# Patient Record
Sex: MALE | Race: WHITE | NOT HISPANIC OR LATINO | Employment: OTHER | URBAN - METROPOLITAN AREA
[De-identification: names, ages, dates, MRNs, and addresses within clinical notes are randomized per-mention and may not be internally consistent; named-entity substitution may affect disease eponyms.]

---

## 2017-06-09 ENCOUNTER — TRANSFERRED RECORDS (OUTPATIENT)
Dept: HEALTH INFORMATION MANAGEMENT | Facility: CLINIC | Age: 72
End: 2017-06-09

## 2017-06-09 ENCOUNTER — MEDICAL CORRESPONDENCE (OUTPATIENT)
Dept: HEALTH INFORMATION MANAGEMENT | Facility: CLINIC | Age: 72
End: 2017-06-09

## 2017-10-03 ENCOUNTER — PRE VISIT (OUTPATIENT)
Dept: ORTHOPEDICS | Facility: CLINIC | Age: 72
End: 2017-10-03

## 2017-10-03 NOTE — TELEPHONE ENCOUNTER
1.  Date/reason for appt: 10/19/17 - Right let tingling & numbness / increased falling    2.  Referring provider: [facility]    3.  Call to patient (Yes / No - short description): yes, pt called. Pt states all his records are with [facility].     4.  Previous care at:   [facility]

## 2017-10-03 NOTE — TELEPHONE ENCOUNTER
Records received from [facility].   Office notes:  DOS 6/9/17 with Dr. Luis Marshall (Ortho)  DOS 9/25/14 with Dr. Pavan Chu (unrelated to spine)  DOS 6/6/12 with Dr. Chepe Urbina    Radiology reports:  DOS 6/4/12 Lumbar XR (results within Dr Urbina's office note)   DOS 7/25/11 CT Myelogram (results within Dr Urbina's office note)     Missing/needed records:  S/P LLIS L2-[facility]  L1-2 fusion and L3-4 with dr vera at [facility] in 1999 or 2000 *Will need Paper Chart

## 2017-10-06 NOTE — TELEPHONE ENCOUNTER
Records Received From: [facility]     Date / Exam / Location   (*specify location only if different than the sending clinic) COMMENTS / MISSING  (be specific)   Office Notes: 2/25/13, 2/18/15 with Dr. Chepe Urbina    Radiology Reports:  (use exam date) - 4/6/17 XR Lumbar Spine  - 2/5/14, 2/5/15, 77/25/11 CT Lumbar Spine  - 1/31/05 Cervical Spine XR  - 1/2/12, 7/25/11 CT Cervical Spine  - 2/5/14, 9/12/12 XR Entire Spine  - 2/5/14 & 2/27/13, 10/2/13, 9/12/13 XR Spine/Lumbar Standing 10/6/17, Imaging CDs sent to Amy Ellis by [employer].    Injection Reports: 8/27/12, 12/18/12, and 7/6/12 Lumbosacral Injections    Op Notes/Procedure: 2/25/13 LLIF L2-3 with Dr. Urbina ([facility])    Implant Records: 2/25/13 LLIF L2-3

## 2017-10-19 ENCOUNTER — MEDICAL CORRESPONDENCE (OUTPATIENT)
Dept: HEALTH INFORMATION MANAGEMENT | Facility: CLINIC | Age: 72
End: 2017-10-19

## 2017-11-07 ASSESSMENT — ENCOUNTER SYMPTOMS
ARTHRALGIAS: 0
LOSS OF CONSCIOUSNESS: 0
TROUBLE SWALLOWING: 0
TREMORS: 0
MEMORY LOSS: 0
BACK PAIN: 1
NECK MASS: 0
SORE THROAT: 0
DIZZINESS: 1
MUSCLE CRAMPS: 1
MYALGIAS: 1
TINGLING: 0
DIFFICULTY URINATING: 0
HEMATURIA: 1
SINUS CONGESTION: 0
SMELL DISTURBANCE: 0
NECK PAIN: 0
STIFFNESS: 1
PARALYSIS: 0
NUMBNESS: 1
JOINT SWELLING: 0
DYSURIA: 1
WEAKNESS: 1
DISTURBANCES IN COORDINATION: 1
TASTE DISTURBANCE: 0
SINUS PAIN: 0
SPEECH CHANGE: 0
FLANK PAIN: 1
HEADACHES: 1
HOARSE VOICE: 0
SEIZURES: 0

## 2017-11-20 DIAGNOSIS — M54.9 BACK PAIN: Primary | ICD-10-CM

## 2017-11-21 ENCOUNTER — OFFICE VISIT (OUTPATIENT)
Dept: ORTHOPEDICS | Facility: CLINIC | Age: 72
End: 2017-11-21

## 2017-11-21 VITALS — HEIGHT: 68 IN | WEIGHT: 260 LBS | BODY MASS INDEX: 39.4 KG/M2

## 2017-11-21 DIAGNOSIS — R26.89 BALANCE PROBLEMS: ICD-10-CM

## 2017-11-21 DIAGNOSIS — Z98.1 ARTHRODESIS STATUS: Primary | ICD-10-CM

## 2017-11-21 DIAGNOSIS — M54.16 LUMBAR RADICULOPATHY: ICD-10-CM

## 2017-11-21 RX ORDER — PANTOPRAZOLE SODIUM 40 MG/1
TABLET, DELAYED RELEASE ORAL
COMMUNITY
Start: 2012-01-01

## 2017-11-21 RX ORDER — VENLAFAXINE HYDROCHLORIDE 150 MG/1
CAPSULE, EXTENDED RELEASE ORAL
COMMUNITY
Start: 2006-01-01

## 2017-11-21 RX ORDER — AMLODIPINE BESYLATE 5 MG/1
TABLET ORAL
COMMUNITY
Start: 2014-01-01

## 2017-11-21 RX ORDER — SIMVASTATIN 40 MG
TABLET ORAL
COMMUNITY
Start: 2006-01-01

## 2017-11-21 RX ORDER — TROSPIUM CHLORIDE 20 MG/1
TABLET, FILM COATED ORAL
COMMUNITY
Start: 2016-01-01

## 2017-11-21 NOTE — PROGRESS NOTES
Spine Surg Hx:    ~1999 - AP fusion L1-2 and L3-4, with unilateral translaminar facet screw fixation; implantation of bone stim (Dr. 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   - Solid AP fusion L1-2 and L3-4.   - Retained internal bone stim.   - L>R L3 radiculopathy.   - Chronic LBP   - Class 2 obesity (BMI 36.55).  Procedure:  Part 1 - lateral:  - LLIF (RIGHT approach) L2-3, using iliac crest autograft.  - Placement of interbody PEEK cage L2-3.  - R anterior iliac crest bone graft harvest via same skin incision  - EMG monitoring.  Part 2 - prone:   - Bilat perc pedicle screw fixation L2-3.  - Removal of internal bone stim.  EBL:  200cc  Vol disc removed L2-3 = 10.2 mL  IMPLANTS:    - NuVasive XLIF system, CoRoent XL PEEK Cage L2-3 = 12mm ht x 22mm width x 60mm length, 10 degrees  - NuVasive Precept perc system: L2 = 7.5 x 50 mm bilat; L3 = 7.5 x 50 mm bilat; Two 5.5 x 45 mm rods.      [facility] (Carlitos).  Imp:    - Adj segment DDD and stenosis L2-3.    - Solid AP fusion L1-2 and L3-4.    - Retained implantable bone stim and post instr L1-2,L3-4.    - Chronic LBP.    - L>R radicular leg pain with neurogenic claudication.  P> L L4-5 TFESI.  Cont exercises (previously given him by PT).  RTC 3 mos.    Telephone note 7/11/2012:  Signed up for surgery.    [facility] (Joanna/Carlitos):  2 yrs postop, extremely satisfied. (-) opioids.  Now with new onset weakness of L ankle dorsiflexion, R ankle plantarflexion, and bee sting sensation in bilat anterio shin along with hypersensitivity along medial L thigh.  Patient relates these sxs to his prostate surgery in 07/2013.      [facility] (Joanna/Anyi).  P> EMG R UE to  shanel for CTS; f/u with Dr. Urbina 1 month.  Answers for HPI/ROS submitted by the patient on 11/7/2017   General Symptoms: No  Skin Symptoms: No  HENT Symptoms: Yes  EYE SYMPTOMS: No  HEART SYMPTOMS: No  LUNG SYMPTOMS: No  INTESTINAL SYMPTOMS: No  URINARY SYMPTOMS: Yes  REPRODUCTIVE SYMPTOMS: No  SKELETAL SYMPTOMS: Yes  BLOOD SYMPTOMS: No  NERVOUS SYSTEM SYMPTOMS: Yes  MENTAL HEALTH SYMPTOMS: No  Ear pain: No  Ear discharge: No  Hearing loss: Yes  Tinnitus: Yes  Nosebleeds: No  Congestion: No  Sinus pain: No  Trouble swallowing: No   Voice hoarseness: No  Mouth sores: No  Sore throat: No  Tooth pain: No  Gum tenderness: No  Bleeding gums: No  Change in taste: No  Change in sense of smell: No  Dry mouth: No  Hearing aid used: Yes  Neck lump: No  Trouble holding urine or incontinence: Yes  Pain or burning: Yes  Trouble starting or stopping: No  Increased frequency of urination: Yes  Blood in urine: Yes  Decreased frequency of urination: No  Frequent nighttime urination: Yes  Flank pain: Yes  Difficulty emptying bladder: No  Back pain: Yes  Muscle aches: Yes  Neck pain: No  Swollen joints: No  Joint pain: No  Bone pain: Yes  Muscle cramps: Yes  Joint stiffness: Yes  Bone fracture: No  Trouble with coordination: Yes  Dizziness or trouble with balance: Yes  Fainting or black-out spells: No  Memory loss: No  Headache: Yes  Seizures: No  Speech problems: No  Tingling: No  Tremor: No  Weakness: Yes  Difficulty walking: Yes  Paralysis: No  Numbness: Yes

## 2017-11-21 NOTE — LETTER
11/21/2017       RE: Gabriel Hunter  [id number]  [place] [address]     Dear Colleague,    Thank you for referring your patient, Gabriel Hunter, to the [facility] at [facility]. Please see a copy of my visit note below.    Spine Surg Hx:     ~1999 - AP fusion L1-2 and L3-4, with unilateral translaminar facet screw fixation; implantation of bone stim (Dr. Dariusz Hill, [facility])     02/25/2013 - LLIF L2-3 (Carlitos, [facility])    REASON FOR CONSULTATION: Consult (Bilateral  leg numbness. States he has bad balance and has had increased fallling.  [facility])     REFERRING PHYSICIAN: Referred Self   PCP:Henrietta Avendano    History of Present Illness:    72/m, referred from [facility].  Previous surgeries as above.  [facility] (Joanna/Anyi).  Referred to me here at [facility].  Accompanied by wife.    Happy with 2013 spine surgery.  Helped with preop sxs.  Had prostate surgery later that year.  Since then, noted bilateral ankle weakness (weak R plantarflexion, weak L dorsiflexion).  Has not noted any improvement since.  Not getting worse either.  Main problem is balance.  Had been falling 2-3x/week before, although recently has not been falling.  Uses cane on R.  Some low back pain (left paraspinal/buttock).    Previous treatment:   PT after last surgery (2013).  Says PT did not help with his weakness.  Does not think PT at this point is going to help.      Oswestry (VASQUEZ) Questionnaire    OSWESTRY DISABILITY INDEX 11/7/2017   Section 1 - Pain intensity 1   Section 2 - Personal care (washing, dressing, etc.)  0   Section 3 - Lifting 4   Section 4 - Walking 3   Section 5 - Sitting 0   Section 6 - Standing 4   Section 7 - Sleeping 0   Section 8 - Sex life (if applicable) -1   Section 9 - Social life 3   Section 10 - Traveling 1   Count 9   Sum 16   Oswestry Score (%) 35.56   SECTION 1-PAIN INTENSITY The pain is very mild  at the moment.   SECTION 2-PERSONAL CARE (WASHING,DRESSING,ETC.) I can look after myself normally without causing additional pain.   SECTION 3-LIFTING I can only lift very light weights.   SECTION 4-WALKING Pain prevents me from walking more than 100 yards.   SECTION 5-SITTING I can sit in any chair as long as I like.   SECTION 6-STANDING Pain prevents me from standing for more than 10 minutes.   SECTION 7-SLEEPING My sleep is never interrupted by pain.   SECTION 8-SEX LIFE (IF APPLICABLE) Not answered/NA   SECTION 9-SOCIAL LIFE Pain has restricted my social life and I do not go out as often.   SECTION 10-TRAVELING I can travel anywhere but it gives me additional pain.   Oswestry Disability Index: Count 9   VASQUEZ: Total Score = SUM (total for answered questions) 16   Computed Oswestry Score 35.55 (%)   Some recent data might be hidden        Back pain 1/10; R leg 0.5, L leg 1.5.      PROMIS-10 Scores  Global Mental Health Score: (P) 9  Global Physical Health Score: (P) 10  PROMIS TOTAL - SUBSCORES: (P) 19    ROS:  A 12-point review of systems was completed and is negative except for otherwise noted above in the history of present illness.    Med Hx:  GERD, ED, sleep apnea, obesity, cataract, depressive disorder, knee osteoarthritis, postate CA, astigmatism, CTS.    Surg Hx:  As above spine surgeries  Also prostate surgery 2013  R TKA ([facility] Aug 2008).  L CTR (April 2012).    Allergies:  Allergies   Allergen Reactions     Penicillins Itching     Sildenafil Muscle Pain (Myalgia)     Tadalafil Muscle Pain (Myalgia)       Meds:  Current Outpatient Prescriptions   Medication     amLODIPine (NORVASC) 5 MG tablet     pantoprazole (PROTONIX) 40 MG EC tablet     simvastatin (ZOCOR) 40 MG tablet     trospium (SANCTURA) 20 MG tablet     venlafaxine (EFFEXOR-XR) 150 MG 24 hr capsule     No current facility-administered medications for this visit.        Fam Hx:  No family history on file.    P/S Hx:  Social History   Substance  "Use Topics     Smoking status: Not on file     Smokeless tobacco: Not on file     Alcohol use Not on file     Retired, nonsmoker, (-) EtOH.    Physical Exam:  Very pleasant, healthy appearing, alert, oriented x 3, cooperative.  Normal mood and affect.  Not in cardiorespiratory distress.  Ht 1.727 m (5' 8\")  Wt 117.9 kg (260 lb)  BMI 39.53 kg/m2   Slightly hunched forward posture, with some knee flexion.  Usually uses cane on R.  Can walk without cane but with some imbalance, mildly wide-based.  Cannot walk on toes/heels (cites weakness R plantarflexion, L dorsiflexion).  (+) well-healed surgical scars: left thoracoabdominal, right MIS LLIF, lumbar bilat paraspinal, lumbar midline, R PSIS.  Limited lumbar ROM.    Neuro Exam:  Motor: 1/5 left TA, EHL.  All others 5/5, including psoas, quads, R TA, R EHL, bilat TA.  However cannot stand on toes/heels.  Sensory:  Mild decreased sensation bilat L2.  Clearly decreased sensation Left L5 dorsum of left foot; however, normal sensation lateral calf.           Imaging: EOS full spine x-rays show fusion L1-L4, some flattening of lumbar lordosis, but relatively mild sagittal imbalance.  LL = 45  L4-S1 = 39  PI = 61  PI-LL mismatch = 16  PT = 24  SVA  = +4.5cm  TPA = 23  GT = 28    04/2017 x-rays compared to 2014 shows some progression of spondylosis/degeneration at levels above the fusion (T12-L1,T11-12,T10-11).    CT 2015 shows bone formation within L2-3 cage, but still with faint cleft (not yet fully bridged).    Impression:   - 4.5 yrs s/p LLIF L2-3 (02/2013 [facility]).  - Solid AP fusion L1-2,L3-4.  - Chronic lumbar radiculopathy bilateral.       Plan:   Had good discussion with patient.  Unhappy with his symptoms at this point.  Cannot guarantee him that we have a surgical solution to offer.  It is possible his foot/ankle weakness may represent some permanent nerve damage.  - Thoracic and lumbar MRI  - Thoracic and lumbar CT  - Bilat LE EMGs    RTC after above, to " review and discuss.  All questions and concerns were answered to the patient's apparent satisfaction before leaving the clinic.     Total visit time > 45 mins, > 50% counseling and coordination of care.    Respectfully,    Chepe Urbina MD    Orthopaedic Spine Surgery  Dept Orthopaedic Surgery, [facility]  [phone] office, [phone] pager  [email or web address]      [facility]:  Op Report 02/25/2013:  Surgeon:  Chepe Urbina MD  Asst:  Antonio Sampson MD PGY-3  Dx:   - Adj segment spondylosis/DDD L2-3   - Stenosis with claudication L2-3.   - Solid AP fusion L1-2 and L3-4.   - Retained internal bone stim.   - L>R L3 radiculopathy.   - Chronic LBP   - Class 2 obesity (BMI 36.55).  Procedure:  Part 1 - lateral:  - LLIF (RIGHT approach) L2-3, using iliac crest autograft.  - Placement of interbody PEEK cage L2-3.  - R anterior iliac crest bone graft harvest via same skin incision  - EMG monitoring.  Part 2 - prone:   - Bilat perc pedicle screw fixation L2-3.  - Removal of internal bone stim.  EBL:  200cc  Vol disc removed L2-3 = 10.2 mL  IMPLANTS:    - NuVasive XLIF system, CoRoent XL PEEK Cage L2-3 = 12mm ht x 22mm width x 60mm length, 10 degrees  - NuVasive Precept perc system: L2 = 7.5 x 50 mm bilat; L3 = 7.5 x 50 mm bilat; Two 5.5 x 45 mm rods.        [facility] (Carlitos).  Imp:    - Adj segment DDD and stenosis L2-3.    - Solid AP fusion L1-2 and L3-4.    - Retained implantable bone stim and post instr L1-2,L3-4.    - Chronic LBP.    - L>R radicular leg pain with neurogenic claudication.  P> L L4-5 TFESI.  Cont exercises (previously given him by PT).  RTC 3 mos.     Telephone note 7/11/2012:  Signed up for surgery.     [facility] (Joanna/Carlitos):  2 yrs postop, extremely satisfied. (-) opioids.  Now with new onset weakness of L ankle dorsiflexion, R ankle plantarflexion, and bee sting sensation in bilat anterio  shin along with hypersensitivity along medial L thigh.  Patient relates these sxs to his prostate surgery in 07/2013.       [facility] (Joanna/Anyi).  P> EMG R UE to eval for CTS; f/u with Dr. Urbina 1 month.      Addendum 11/25/17:  Reviewed thoracic and lumbar CT done 11/21/17: solid interbody fusion L1-2,L2-3,L3-4; (+) advanced facet arthrosis L4-5,L5-S1, with relatively preserved disc spaces; (+) advanced multilevel spondylosis with bridging osteophytes thoracic spine.  NO worrisome spine findings that warrant urgent treatment.  Radiologist (Dr. Guerra) also mentioned left lower lobe pulmonary nodule, that seems stable compared to 02/2014 x-ray.    Will discuss further with patient when he returns to clinic.  Will ask my nurses Shanae Carlin / Jasmin Toribio to forward radiologist report to PCP (Dr. Henrietta Avendano at [facility]); will defer to PCP re need for further workup of pulmonary nodule.      Again, thank you for allowing me to participate in the care of your patient.      Sincerely,    Chepe Urbina MD
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
## 2017-11-21 NOTE — PROGRESS NOTES
Spine Surg Hx:     ~1999 - AP fusion L1-2 and L3-4, with unilateral translaminar facet screw fixation; implantation of bone stim (Dr. Dariusz Hill, [facility])     02/25/2013 - LLIF L2-3 (Carlitos, [facility])    REASON FOR CONSULTATION: Consult (Bilateral  leg numbness. States he has bad balance and has had increased fallling.  [facility]     REFERRING PHYSICIAN: Referred Self   PCP:Henrietta Avendano A    History of Present Illness:    72/m, referred from [facility].  Previous surgeries as above.  [facility] (Joanna/Anyi).  Referred to me here at [facility].  Accompanied by wife.    Happy with 2013 spine surgery.  Helped with preop sxs.  Had prostate surgery later that year.  Since then, noted bilateral ankle weakness (weak R plantarflexion, weak L dorsiflexion).  Has not noted any improvement since.  Not getting worse either.  Main problem is balance.  Had been falling 2-3x/week before, although recently has not been falling.  Uses cane on R.  Some low back pain (left paraspinal/buttock).    Previous treatment:   PT after last surgery (2013).  Says PT did not help with his weakness.  Does not think PT at this point is going to help.      Oswestry (VASQUEZ) Questionnaire    OSWESTRY DISABILITY INDEX 11/7/2017   Section 1 - Pain intensity 1   Section 2 - Personal care (washing, dressing, etc.)  0   Section 3 - Lifting 4   Section 4 - Walking 3   Section 5 - Sitting 0   Section 6 - Standing 4   Section 7 - Sleeping 0   Section 8 - Sex life (if applicable) -1   Section 9 - Social life 3   Section 10 - Traveling 1   Count 9   Sum 16   Oswestry Score (%) 35.56   SECTION 1-PAIN INTENSITY The pain is very mild at the moment.   SECTION 2-PERSONAL CARE (WASHING,DRESSING,ETC.) I can look after myself normally without causing additional pain.   SECTION 3-LIFTING I can only lift very light weights.   SECTION 4-WALKING Pain prevents me from walking more than 100 yards.   SECTION 5-SITTING I can sit  in any chair as long as I like.   SECTION 6-STANDING Pain prevents me from standing for more than 10 minutes.   SECTION 7-SLEEPING My sleep is never interrupted by pain.   SECTION 8-SEX LIFE (IF APPLICABLE) Not answered/NA   SECTION 9-SOCIAL LIFE Pain has restricted my social life and I do not go out as often.   SECTION 10-TRAVELING I can travel anywhere but it gives me additional pain.   Oswestry Disability Index: Count 9   VASQUEZ: Total Score = SUM (total for answered questions) 16   Computed Oswestry Score 35.55 (%)   Some recent data might be hidden        Back pain 1/10; R leg 0.5, L leg 1.5.      PROMIS-10 Scores  Global Mental Health Score: (P) 9  Global Physical Health Score: (P) 10  PROMIS TOTAL - SUBSCORES: (P) 19    ROS:  A 12-point review of systems was completed and is negative except for otherwise noted above in the history of present illness.    Med Hx:  GERD, ED, sleep apnea, obesity, cataract, depressive disorder, knee osteoarthritis, postate CA, astigmatism, CTS.    Surg Hx:  As above spine surgeries  Also prostate surgery 2013  R TKA ([facility] Aug 2008).  L CTR (April 2012).    Allergies:  Allergies   Allergen Reactions     Penicillins Itching     Sildenafil Muscle Pain (Myalgia)     Tadalafil Muscle Pain (Myalgia)       Meds:  Current Outpatient Prescriptions   Medication     amLODIPine (NORVASC) 5 MG tablet     pantoprazole (PROTONIX) 40 MG EC tablet     simvastatin (ZOCOR) 40 MG tablet     trospium (SANCTURA) 20 MG tablet     venlafaxine (EFFEXOR-XR) 150 MG 24 hr capsule     No current facility-administered medications for this visit.        Fam Hx:  No family history on file.    P/S Hx:  Social History   Substance Use Topics     Smoking status: Not on file     Smokeless tobacco: Not on file     Alcohol use Not on file     Retired, nonsmoker, (-) EtOH.    Physical Exam:  Very pleasant, healthy appearing, alert, oriented x 3, cooperative.  Normal mood and affect.  Not in cardiorespiratory  "distress.  Ht 1.727 m (5' 8\")  Wt 117.9 kg (260 lb)  BMI 39.53 kg/m2   Slightly hunched forward posture, with some knee flexion.  Usually uses cane on R.  Can walk without cane but with some imbalance, mildly wide-based.  Cannot walk on toes/heels (cites weakness R plantarflexion, L dorsiflexion).  (+) well-healed surgical scars: left thoracoabdominal, right MIS LLIF, lumbar bilat paraspinal, lumbar midline, R PSIS.  Limited lumbar ROM.    Neuro Exam:  Motor: 1/5 left TA, EHL.  All others 5/5, including psoas, quads, R TA, R EHL, bilat TA.  However cannot stand on toes/heels.  Sensory:  Mild decreased sensation bilat L2.  Clearly decreased sensation Left L5 dorsum of left foot; however, normal sensation lateral calf.           Imaging: EOS full spine x-rays show fusion L1-L4, some flattening of lumbar lordosis, but relatively mild sagittal imbalance.  LL = 45  L4-S1 = 39  PI = 61  PI-LL mismatch = 16  PT = 24  SVA  = +4.5cm  TPA = 23  GT = 28    04/2017 x-rays compared to 2014 shows some progression of spondylosis/degeneration at levels above the fusion (T12-L1,T11-12,T10-11).    CT 2015 shows bone formation within L2-3 cage, but still with faint cleft (not yet fully bridged).    Impression:   - 4.5 yrs s/p LLIF L2-3 (02/2013 Carlitos [facility]).  - Solid AP fusion L1-2,L3-4.  - Chronic lumbar radiculopathy bilateral.       Plan:   Had good discussion with patient.  Unhappy with his symptoms at this point.  Cannot guarantee him that we have a surgical solution to offer.  It is possible his foot/ankle weakness may represent some permanent nerve damage.  - Thoracic and lumbar MRI  - Thoracic and lumbar CT  - Bilat LE EMGs    RTC after above, to review and discuss.  All questions and concerns were answered to the patient's apparent satisfaction before leaving the clinic.     Total visit time > 45 mins, > 50% counseling and coordination of care.    Respectfully,    Chepe Urbina MD  Associate " Professor  Orthopaedic Spine Surgery  Dept Orthopaedic Surgery, [facility]  [phone] office, [phone] pager  [email or web address]      [facility]:  Op Report 02/25/2013:  Surgeon:  Chepe Urbina MD  Asst:  Antonio Sampson MD PGY-3  Dx:   - Adj segment spondylosis/DDD L2-3   - Stenosis with claudication L2-3.   - Solid AP fusion L1-2 and L3-4.   - Retained internal bone stim.   - L>R L3 radiculopathy.   - Chronic LBP   - Class 2 obesity (BMI 36.55).  Procedure:  Part 1 - lateral:  - LLIF (RIGHT approach) L2-3, using iliac crest autograft.  - Placement of interbody PEEK cage L2-3.  - R anterior iliac crest bone graft harvest via same skin incision  - EMG monitoring.  Part 2 - prone:   - Bilat perc pedicle screw fixation L2-3.  - Removal of internal bone stim.  EBL:  200cc  Vol disc removed L2-3 = 10.2 mL  IMPLANTS:    - NuVasive XLIF system, CoRoent XL PEEK Cage L2-3 = 12mm ht x 22mm width x 60mm length, 10 degrees  - NuVasive Precept perc system: L2 = 7.5 x 50 mm bilat; L3 = 7.5 x 50 mm bilat; Two 5.5 x 45 mm rods.        [facility] (Carlitos).  Imp:    - Adj segment DDD and stenosis L2-3.    - Solid AP fusion L1-2 and L3-4.    - Retained implantable bone stim and post instr L1-2,L3-4.    - Chronic LBP.    - L>R radicular leg pain with neurogenic claudication.  P> L L4-5 TFESI.  Cont exercises (previously given him by PT).  RTC 3 mos.     Telephone note 7/11/2012:  Signed up for surgery.     [facility] (Joanna/Carlitos):  2 yrs postop, extremely satisfied. (-) opioids.  Now with new onset weakness of L ankle dorsiflexion, R ankle plantarflexion, and bee sting sensation in bilat anterio shin along with hypersensitivity along medial L thigh.  Patient relates these sxs to his prostate surgery in 07/2013.       [facility] (Joanna/Anyi).  P> EMG R UE to eval for CTS; f/u with Dr. Urbina 1 month.  Answers for  HPI/ROS submitted by the patient on 11/7/2017   General Symptoms: No  Skin Symptoms: No  HENT Symptoms: Yes  EYE SYMPTOMS: No  HEART SYMPTOMS: No  LUNG SYMPTOMS: No  INTESTINAL SYMPTOMS: No  URINARY SYMPTOMS: Yes  REPRODUCTIVE SYMPTOMS: No  SKELETAL SYMPTOMS: Yes  BLOOD SYMPTOMS: No  NERVOUS SYSTEM SYMPTOMS: Yes  MENTAL HEALTH SYMPTOMS: No  Ear pain: No  Ear discharge: No  Hearing loss: Yes  Tinnitus: Yes  Nosebleeds: No  Congestion: No  Sinus pain: No  Trouble swallowing: No   Voice hoarseness: No  Mouth sores: No  Sore throat: No  Tooth pain: No  Gum tenderness: No  Bleeding gums: No  Change in taste: No  Change in sense of smell: No  Dry mouth: No  Hearing aid used: Yes  Neck lump: No  Trouble holding urine or incontinence: Yes  Pain or burning: Yes  Trouble starting or stopping: No  Increased frequency of urination: Yes  Blood in urine: Yes  Decreased frequency of urination: No  Frequent nighttime urination: Yes  Flank pain: Yes  Difficulty emptying bladder: No  Back pain: Yes  Muscle aches: Yes  Neck pain: No  Swollen joints: No  Joint pain: No  Bone pain: Yes  Muscle cramps: Yes  Joint stiffness: Yes  Bone fracture: No  Trouble with coordination: Yes  Dizziness or trouble with balance: Yes  Fainting or black-out spells: No  Memory loss: No  Headache: Yes  Seizures: No  Speech problems: No  Tingling: No  Tremor: No  Weakness: Yes  Difficulty walking: Yes  Paralysis: No  Numbness: Yes    Addendum 11/25/17:  Reviewed thoracic and lumbar CT done 11/21/17: solid interbody fusion L1-2,L2-3,L3-4; (+) advanced facet arthrosis L4-5,L5-S1, with relatively preserved disc spaces; (+) advanced multilevel spondylosis with bridging osteophytes thoracic spine.  NO worrisome spine findings that warrant urgent treatment.  Radiologist (Dr. Guerra) also mentioned left lower lobe pulmonary nodule, that seems stable compared to 02/2014 x-ray.    Will discuss further with patient when he returns to clinic.  Will ask my nurses Shanae  Raegan / Jasmin Toribio to forward radiologist report to PCP (Dr. Henrietta Avendano at [facility]); will defer to PCP re need for further workup of pulmonary nodule.

## 2017-11-21 NOTE — MR AVS SNAPSHOT
After Visit Summary   11/21/2017    Gabriel Hunter    MRN: [id number]           Patient Information     Date Of Birth          1945        Visit Information        Provider Department      11/21/2017 7:00 AM Chepe Urbina MD [facility]        Today's Diagnoses     s/p fusion L1-L4    -  1    Balance problems        Lumbar radiculopathy           Follow-ups after your visit        Your next 10 appointments already scheduled     Nov 21, 2017  8:00 PM CST   (Arrive by 7:45 PM)   CT LUMBAR SPINE W/O CONTRAST with [address] ([facility])    [address] [id number] [phone]           Please bring any scans or X-rays taken at other hospitals, if similar tests were done. Also bring a list of your medicines, including vitamins, minerals and over-the-counter drugs. It is safest to leave personal items at home.  Be sure to tell your doctor:   If you have any allergies.   If there s any chance you are pregnant.   If you are breastfeeding.   If you have any special needs.  You do not need to do anything special to prepare.  Please wear loose clothing, such as a sweat suit or jogging clothes. Avoid snaps, zippers and other metal. We may ask you to undress and put on a hospital gown.            Nov 21, 2017  8:20 PM CST   (Arrive by 8:05 PM)   CT THORACIC SPINE W/O CONTRAST with [address] ([facility])    [address] [id number] [phone]           Please bring any scans or X-rays taken at other hospitals, if similar tests were done. Also bring a list of your medicines, including vitamins, minerals and over-the-counter drugs. It is safest to leave personal items at home.  Be sure to tell your doctor:   If you have any allergies.   If there s any chance you are pregnant.   If you are breastfeeding.   If you  have any special needs.  You do not need to do anything special to prepare.  Please wear loose clothing, such as a sweat suit or jogging clothes. Avoid snaps, zippers and other metal. We may ask you to undress and put on a hospital gown.            Nov 30, 2017  7:00 PM CST   (Arrive by 6:45 PM)   MR LUMBAR SPINE W/O CONTRAST with [id number]   [facility] MRI ([facility])    [address] [address] [phone]           Take your medicines as usual, unless your doctor tells you not to. Bring a list of your current medicines to your exam (including vitamins, minerals and over-the-counter drugs). Also bring the results of similar scans you may have had.  Please remove any body piercings and hair extensions before you arrive.  Follow your doctor s orders. If you do not, we may have to postpone your exam.  You will not have contrast for this exam. You do not need to do anything special to prepare.  The MRI machine uses a strong magnet. Please wear clothes without metal (snaps, zippers). A sweatsuit works well, or we may give you a hospital gown.   **IMPORTANT** THE INSTRUCTIONS BELOW ARE ONLY FOR THOSE PATIENTS WHO HAVE BEEN TOLD THEY WILL RECEIVE SEDATION OR GENERAL ANESTHESIA DURING THEIR MRI PROCEDURE:  IF YOU WILL RECEIVE SEDATION (take medicine to help you relax during your exam):   You must get the medicine from your doctor before you arrive. Bring the medicine to the exam. Do not take it at home.   Arrive one hour early. Bring someone who can take you home after the test. Your medicine will make you sleepy. After the exam, you may not drive, take a bus or take a taxi by yourself.   No eating 8 hours before your exam. You may have clear liquids up until 4 hours before your exam. (Clear liquids include water, clear tea, black coffee and fruit juice without pulp.)  IF YOU WILL RECEIVE ANESTHESIA (be asleep for your exam):   Arrive 1 1/2 hours  early. Bring someone who can take you home after the test. You may not drive, take a bus or take a taxi by yourself.   No eating 8 hours before your exam. You may have clear liquids up until 4 hours before your exam. (Clear liquids include water, clear tea, black coffee and fruit juice without pulp.)   You will spend four to five hours in the recovery room.  Please call the Imaging Department at your exam site with any questions.            Nov 30, 2017  7:45 PM CST   (Arrive by 7:30 PM)   MR THORACIC SPINE W/O CONTRAST with [id number]   [facility] MRI ([facility])    [address] [facility]  1st Floor  [facility] [address] [phone]           Take your medicines as usual, unless your doctor tells you not to. Bring a list of your current medicines to your exam (including vitamins, minerals and over-the-counter drugs). Also bring the results of similar scans you may have had.  Please remove any body piercings and hair extensions before you arrive.  Follow your doctor s orders. If you do not, we may have to postpone your exam.  You will not have contrast for this exam. You do not need to do anything special to prepare.  The MRI machine uses a strong magnet. Please wear clothes without metal (snaps, zippers). A sweatsuit works well, or we may give you a hospital gown.   **IMPORTANT** THE INSTRUCTIONS BELOW ARE ONLY FOR THOSE PATIENTS WHO HAVE BEEN TOLD THEY WILL RECEIVE SEDATION OR GENERAL ANESTHESIA DURING THEIR MRI PROCEDURE:  IF YOU WILL RECEIVE SEDATION (take medicine to help you relax during your exam):   You must get the medicine from your doctor before you arrive. Bring the medicine to the exam. Do not take it at home.   Arrive one hour early. Bring someone who can take you home after the test. Your medicine will make you sleepy. After the exam, you may not drive, take a bus or take a taxi by yourself.   No eating 8 hours before your exam. You may have clear liquids up  until 4 hours before your exam. (Clear liquids include water, clear tea, black coffee and fruit juice without pulp.)  IF YOU WILL RECEIVE ANESTHESIA (be asleep for your exam):   Arrive 1 1/2 hours early. Bring someone who can take you home after the test. You may not drive, take a bus or take a taxi by yourself.   No eating 8 hours before your exam. You may have clear liquids up until 4 hours before your exam. (Clear liquids include water, clear tea, black coffee and fruit juice without pulp.)   You will spend four to five hours in the recovery room.  Please call the Imaging Department at your exam site with any questions.            Dec 12, 2017  8:45 AM CST   (Arrive by 8:30 AM)   EMG with Jose A Clarke MD   [facility] ([facility])    [address] [place] [address] [phone]           Do not use lotions or creams on the area to be tested. If you are on blood thinners (Warfarin, Coumadin, Lovenox, etc), please contact your primary care physician to check if it is safe to stop them 3 days prior to testing. If you have anxiety, please check with your referring physician to obtain anti-anxiety medication for the procedure.            Jan 04, 2018 10:20 AM CST   (Arrive by 9:50 AM)   RETURN SPINE with Chepe Urbina MD   [facility] ([facility])    [address] [place] [address] [phone]              Future tests that were ordered for you today     Open Future Orders        Priority Expected Expires Ordered    MRI Lumbar spine w/o contrast Routine  11/21/2018 11/21/2017    MRI Thoracic spine w/o contrast Routine  11/21/2018 11/21/2017    CT Thoracic spine w/o contrast Routine  11/21/2018 11/21/2017    CT lumbar spine without contrast Routine  11/21/2018 11/21/2017    EMG ([facility]) Routine  12/21/2017 11/21/2017            Who to contact     Please call  "your clinic at [phone] to:    Ask questions about your health    Make or cancel appointments    Discuss your medicines    Learn about your test results    Speak to your doctor   If you have compliments or concerns about an experience at your clinic, or if you wish to file a complaint, please contact [facility] Patient Relations at [phone] or email us at [email or web address]         Additional Information About Your Visit        [facility] Information     [employer] gives you secure access to your electronic health record. If you see a primary care provider, you can also send messages to your care team and make appointments. If you have questions, please call your primary care clinic.  If you do not have a primary care provider, please call [phone] and they will assist you.      [employer] is an electronic gateway that provides easy, online access to your medical records. With [employer], you can request a clinic appointment, read your test results, renew a prescription or communicate with your care team.     To access your existing account, please contact your [facility] or call [phone] for assistance.        Care EveryWhere ID     This is your Care EveryWhere ID. This could be used by other organizations to access your [place] medical records  [id number]        Your Vitals Were     Height BMI (Body Mass Index)                1.727 m (5' 8\") 39.53 kg/m2           Blood Pressure from Last 3 Encounters:   No data found for BP    Weight from Last 3 Encounters:   11/21/17 117.9 kg (260 lb)               Primary Care Provider Office Phone # Fax #    Henrietta LIU Romana [phone]       [facility]   [facility] [address]        Equal Access to Services     BITA FUENTES : Abimbola Torres, giovanny nixon, magdy aguilera. [employer] " [phone].    ATENCIÓN: Si kym pena, tiene a francisco disposición servicios gratuitos de asistencia lingüística. [place] denney [phone].    We comply with applicable federal civil rights laws and [place] laws. We do not discriminate on the basis of race, color, national origin, age, disability, sex, sexual orientation, or gender identity.            Thank you!     Thank you for choosing [facility]  for your care. Our goal is always to provide you with excellent care. Hearing back from our patients is one way we can continue to improve our services. Please take a few minutes to complete the written survey that you may receive in the mail after your visit with us. Thank you!             Your Updated Medication List - Protect others around you: Learn how to safely use, store and throw away your medicines at www.disposemymeds.org.          This list is accurate as of: 11/21/17  8:09 AM.  Always use your most recent med list.                   Brand Name Dispense Instructions for use Diagnosis    amLODIPine 5 MG tablet    NORVASC      Arthrodesis status, Balance problems, Lumbar radiculopathy       pantoprazole 40 MG EC tablet    PROTONIX      Arthrodesis status, Balance problems, Lumbar radiculopathy       simvastatin 40 MG tablet    ZOCOR      Arthrodesis status, Balance problems, Lumbar radiculopathy       trospium 20 MG tablet    SANCTURA      Arthrodesis status, Balance problems, Lumbar radiculopathy       venlafaxine 150 MG 24 hr capsule    EFFEXOR-XR      Arthrodesis status, Balance problems, Lumbar radiculopathy

## 2017-12-12 ENCOUNTER — OFFICE VISIT (OUTPATIENT)
Dept: NEUROLOGY | Facility: CLINIC | Age: 72
End: 2017-12-12

## 2017-12-12 DIAGNOSIS — G60.8 SENSORY POLYNEUROPATHY: ICD-10-CM

## 2017-12-12 DIAGNOSIS — M54.10 POLYRADICULOPATHY: ICD-10-CM

## 2017-12-12 NOTE — MR AVS SNAPSHOT
After Visit Summary   12/12/2017    Gabriel Hunter    MRN: [id number]           Patient Information     Date Of Birth          1945        Visit Information        Provider Department      12/12/2017 8:45 AM Jose A Clarke MD [facility] EMG        Today's Diagnoses     Polyradiculopathy        Sensory polyneuropathy           Follow-ups after your visit        Your next 10 appointments already scheduled     Jan 04, 2018 10:20 AM CST   (Arrive by 9:50 AM)   RETURN SPINE with Chepe Urbina MD   [facility] ([facility])    [address] [place] [id number] [phone]              Future tests that were ordered for you today     Open Future Orders        Priority Expected Expires Ordered    Needle EMG Each Extremity w/Paraspinal Area Complete (21455) Routine  12/12/2018 12/12/2017            Who to contact     Please call your clinic at [phone] to:    Ask questions about your health    Make or cancel appointments    Discuss your medicines    Learn about your test results    Speak to your doctor   If you have compliments or concerns about an experience at your clinic, or if you wish to file a complaint, please contact [facility] Patient Relations at [phone] or email us at [email or web address]         Additional Information About Your Visit        MyChart Information     [employer] gives you secure access to your electronic health record. If you see a primary care provider, you can also send messages to your care team and make appointments. If you have questions, please call your primary care clinic.  If you do not have a primary care provider, please call [phone] and they will assist you.      [employer] is an electronic gateway that provides easy, online access to your medical records. With [employer], you can request a clinic appointment, read your test results,  renew a prescription or communicate with your care team.     To access your existing account, please contact your [facility] or call [phone] for assistance.        Care EveryWhere ID     This is your Care EveryWhere ID. This could be used by other organizations to access your [place] medical records  [id number]         Blood Pressure from Last 3 Encounters:   No data found for BP    Weight from Last 3 Encounters:   11/21/17 117.9 kg (260 lb)              We Performed the Following     EMG ([facility])     HC NCS MOTOR W OR W/O F-WAVE, 7 OR 8        Primary Care Provider Office Phone # Fax #    Henrietta Avendano [phone]       [facility] [address] [facility] [address]        Equal Access to Services     BITA FUENTES : Hadii aad ku hadasho Soomaali, waaxda luqadaha, qaybta kaalmada adeegyada, waxay idiin hayaan anastacio real . [facility] [phone].    ATENCIÓN: Si habla español, tiene a francisco disposición servicios gratuitos de asistencia lingüística. [facility] [phone].    We comply with applicable federal civil rights laws and [place] laws. We do not discriminate on the basis of race, color, national origin, age, disability, sex, sexual orientation, or gender identity.            Thank you!     Thank you for choosing [facility]  for your care. Our goal is always to provide you with excellent care. Hearing back from our patients is one way we can continue to improve our services. Please take a few minutes to complete the written survey that you may receive in the mail after your visit with us. Thank you!             Your Updated Medication List - Protect others around you: Learn how to safely use, store and throw away your medicines at www.disposemymeds.org.          This list is accurate as of: 12/12/17  1:05 PM.  Always use your most recent med list.                   Brand Name Dispense Instructions for use Diagnosis    amLODIPine 5  MG tablet    NORVASC      Arthrodesis status, Balance problems, Lumbar radiculopathy       pantoprazole 40 MG EC tablet    PROTONIX      Arthrodesis status, Balance problems, Lumbar radiculopathy       simvastatin 40 MG tablet    ZOCOR      Arthrodesis status, Balance problems, Lumbar radiculopathy       trospium 20 MG tablet    SANCTURA      Arthrodesis status, Balance problems, Lumbar radiculopathy       venlafaxine 150 MG 24 hr capsule    EFFEXOR-XR      Arthrodesis status, Balance problems, Lumbar radiculopathy

## 2017-12-12 NOTE — LETTER
12/12/2017       RE: Gabriel Hunter  [id number]  [place] [address]     Dear Colleague,    Thank you for referring your patient, Gabriel Hunter, to the [facility] EMG at [facility]. Please see a copy of my visit note below.        [facility]  Electrodiagnostic Laboratory    Nerve Conduction & EMG Report          Patient:       Gabriel Hunter  Patient ID:    [id number]  Gender:        Male  YOB: 1945  Age:           72 Years 5 Months      Referring Physician: Chepe Urbina MD    History & Examination:    72 year old man with left foot drop and weakness of his right foot's plantarflexion. He is not absolutely sure when exactly those symptoms began, but he relates those to a prostate surgery he had in 7/2013. Prior to this he [facility] He also reports constant bilateral feet numbness. Query lumbosacral radiculopathies vs plexopathy vs other etiology for those symptoms.    Techniques: Motor and sensory conduction studies were done with surface recording electrodes. Temperature was monitored and recorded throughout the study. Upper extremities were maintained at a temperature of 32 degrees Centigrade or higher.  Lower extremities were maintained at a temperature of 31degrees Centigrade or higher. EMG was done with a concentric needle electrode.     Results:    Left radial antidromic sensory NCS showed mildly attenuated SNAP amplitude and normal conduction velocity. Bilateral sural SNAPs were absent. Left median motor NCS showed minimally prolonged distal latency, normal CMAP amplitudes, and mildly attenuated forearm conduction velocity. Bilateral deep peroneal motor NCSs recorded from EDB showed no response. Bilateral peroneal motor NCSs recorded from the tibialis anterior (TA) showed markedly (left) or mildly (right) attenuated CMAP amplitudes without segmental changes, and normal  conduction velocities. Bilateral tibial motor NCSs showed normal distal latencies, attenuated CMAP amplitudes, right worse than left, and moderately attenuated conduction velocities on both sides. Right tibial F-wave latencies were prolonged.  Needle EMG study was very abnormal. There were changes of active denervation (spontaneous activity) at muscles innervated by bilateral L5 and S1, and right L3 segments/myotomes. There were changes of chronic denervation/reinnervation (large, long duration MUPs with reduced recruitment patterns) among multiple segments/myotomes, including bilateral L4, L5, S1, and the right L3. The most severely affected segments were the left L5, and the right S1, as clinically expected. Paraspinal muscles were not sampled, because interpretation of changes appreciated at those muscles after lumbar fusion surgery is uncertain/controversial.    Interpretation:    Abnormal study. There is electrodiagnostic evidence of:    1. Bilateral S1, L5, L4, and to a lesser extent right L3, polyradiculopathies, vs anterior horn disease at the same levels, vs lumbosacral plexopathy. In this case, distinction between the above diagnoses cannot be accomplished by further EMG study, and requires clinical correlation, largely because paraspinal muscles are not useful to make a differential diagnosis, due to recent fusion.   2. Axonal, sensory predominant, length dependent polyneuropathy.    EMG Physician:    Jose A Clarke MD      Sensory NCS      Nerve / Sites Rec. Site Onset Peak NP Amp Ref. PP Amp Dist Jono Ref. Temp     ms ms  V  V  V cm m/s m/s  C   L RADIAL - Snuff      Forearm Snuff 2.40 3.13 13.1 15.0 10.4 12.5 52.2 48.0 32   R SURAL - Lat Mall 60      Calf Ankle 4.22 5.36 2.8 5.0 4.5 14 33.2 38.0 34      Calf Ankle NR NR NR 5.0 NR 14 NR  29      Calf Ankle    5.0  14   28.9   L SURAL - Lat Mall 60      Calf Ankle NR NR NR 5.0 NR 14 NR 38.0 31      Calf Ankle NR NR NR 5.0 NR 14 NR  31      Calf  Ankle NR NR NR 5.0 NR 14 NR  29.4       Motor NCS      Nerve / Sites Rec. Site Lat Ref. Amp Ref. Rel Amp Dist Jono Ref. Dur. Area Temp.     ms ms mV mV % cm m/s m/s ms %  C   L MEDIAN - APB      Wrist APB 4.43 4.40 9.7 5.0 100 8   5.78 100 32.4      Elbow APB 8.80  9.8  101 20.3 46.4 48.0 5.89 103 32.4   R DEEP PERONEAL - EDB 60      Ankle EDB NR 6.00 NR 2.0 NR 8   NR NR 28.7   L DEEP PERONEAL - EDB 60      Ankle EDB NR 6.00 NR 2.0 NR 8   NR NR 29.6   R TIBIAL - AH      Ankle AH 4.64 6.00 0.5 4.0 100 8   7.24 100 31      Pop Fos AH 18.13  0.3  61.5 43 31.9 38.0 9.32 114 31   L TIBIAL - AH      Ankle AH 5.16 6.00 1.3 4.0 100 8   6.20 100 31      Pop Fos AH 18.85  0.3  24.1 42 30.7 38.0 5.05 47.7 29   R PERONEAL - Tib Ant      Fib Head Tib Ant 5.26  2.8  100    8.65 100 31      Knee Tib Ant 7.40  2.9  103 10 46.8  8.75 102 31   L PERONEAL - Tib Ant      Fib Head Tib Ant 4.48  0.3  100    5.42 100 31      Knee Tib Ant 6.56  0.5  175 10 48.0  6.20 204 31       F  Wave      Nerve Min F Lat Max F Lat Mean FLat Temp.    ms ms ms  C   R TIBIAL 66.41 74.84 71.75 31       EMG Summary Table     Spontaneous MUAP Recruitment    IA Fib PSW Fasc H.F. Amp Dur. PPP Pattern   L. TIB ANTERIOR N 3+ 3+ None CRD 1+ 2+ 2+ Sev Reduced to Discrete   L. GASTROCN (MED) N 2+ 2+ 2+ (slow) None N N N N   L. VAST LATERALIS N None None None None 2+ 1+ N N   L. BIC FEM (S HEAD) N None None None None 2+ 2+ N Mod Reduced   L. GLUTEUS MED N 2+ 2+ None CRD 2+ 2+ N Mild-Mod Reduced   R. GASTROCN (MED) N 3+ None None CRD N 2+ 1+ Sev Reduced   R. TIB ANTERIOR N 3+ 3+ None None 2+ 3+ N Mod-SevReduced   R. VAST MEDIALIS N None None None None 2+ 1+ N Mild Reduced   R. ADD LONGUS N None None 2+ (slow) CRD 2+ 1+ N Mild-ModReduced   R. BIC FEM (S HEAD) N 3+ 3+ None CRD 2+ 2+ N Sev Reduced   R. GLUTEUS MED N None None None None 2+ 2+ N Sev Reduced                                  Again, thank you for allowing me to participate in the care of your patient.       Sincerely,    Jose A Clarke MD

## 2017-12-12 NOTE — PROGRESS NOTES
[facility]  Electrodiagnostic Laboratory    Nerve Conduction & EMG Report          Patient:       Gabriel Hunter  Patient ID:    [id number]  Gender:        Male  YOB: 1945  Age:           72 Years 5 Months      Referring Physician: Chepe Urbina MD    History & Examination:    72 year old man with left foot drop and weakness of his right foot's plantarflexion. He is not absolutely sure when exactly those symptoms began, but he relates those to a prostate surgery he had in 7/2013. Prior to this he [facility] He also reports constant bilateral feet numbness. Query lumbosacral radiculopathies vs plexopathy vs other etiology for those symptoms.    Techniques: Motor and sensory conduction studies were done with surface recording electrodes. Temperature was monitored and recorded throughout the study. Upper extremities were maintained at a temperature of 32 degrees Centigrade or higher.  Lower extremities were maintained at a temperature of 31degrees Centigrade or higher. EMG was done with a concentric needle electrode.     Results:    Left radial antidromic sensory NCS showed mildly attenuated SNAP amplitude and normal conduction velocity. Bilateral sural SNAPs were absent. Left median motor NCS showed minimally prolonged distal latency, normal CMAP amplitudes, and mildly attenuated forearm conduction velocity. Bilateral deep peroneal motor NCSs recorded from EDB showed no response. Bilateral peroneal motor NCSs recorded from the tibialis anterior (TA) showed markedly (left) or mildly (right) attenuated CMAP amplitudes without segmental changes, and normal conduction velocities. Bilateral tibial motor NCSs showed normal distal latencies, attenuated CMAP amplitudes, right worse than left, and moderately attenuated conduction velocities on both sides. Right tibial F-wave latencies were prolonged.  Needle EMG study was very abnormal.  There were changes of active denervation (spontaneous activity) at muscles innervated by bilateral L5 and S1, and right L3 segments/myotomes. There were changes of chronic denervation/reinnervation (large, long duration MUPs with reduced recruitment patterns) among multiple segments/myotomes, including bilateral L4, L5, S1, and the right L3. The most severely affected segments were the left L5, and the right S1, as clinically expected. Paraspinal muscles were not sampled, because interpretation of changes appreciated at those muscles after lumbar fusion surgery is uncertain/controversial.    Interpretation:    Abnormal study. There is electrodiagnostic evidence of:    1. Bilateral S1, L5, L4, and to a lesser extent right L3, polyradiculopathies, vs anterior horn disease at the same levels, vs lumbosacral plexopathy. In this case, distinction between the above diagnoses cannot be accomplished by further EMG study, and requires clinical correlation, largely because paraspinal muscles are not useful to make a differential diagnosis, due to recent fusion.   2. Axonal, sensory predominant, length dependent polyneuropathy.    EMG Physician:    Jose A Clarke MD      Sensory NCS      Nerve / Sites Rec. Site Onset Peak NP Amp Ref. PP Amp Dist Jono Ref. Temp     ms ms  V  V  V cm m/s m/s  C   L RADIAL - Snuff      Forearm Snuff 2.40 3.13 13.1 15.0 10.4 12.5 52.2 48.0 32   R SURAL - Lat Mall 60      Calf Ankle 4.22 5.36 2.8 5.0 4.5 14 33.2 38.0 34      Calf Ankle NR NR NR 5.0 NR 14 NR  29      Calf Ankle    5.0  14   28.9   L SURAL - Lat Mall 60      Calf Ankle NR NR NR 5.0 NR 14 NR 38.0 31      Calf Ankle NR NR NR 5.0 NR 14 NR  31      Calf Ankle NR NR NR 5.0 NR 14 NR  29.4       Motor NCS      Nerve / Sites Rec. Site Lat Ref. Amp Ref. Rel Amp Dist Jono Ref. Dur. Area Temp.     ms ms mV mV % cm m/s m/s ms %  C   L MEDIAN - APB      Wrist APB 4.43 4.40 9.7 5.0 100 8   5.78 100 32.4      Elbow APB 8.80  9.8  101 20.3 46.4  48.0 5.89 103 32.4   R DEEP PERONEAL - EDB 60      Ankle EDB NR 6.00 NR 2.0 NR 8   NR NR 28.7   L DEEP PERONEAL - EDB 60      Ankle EDB NR 6.00 NR 2.0 NR 8   NR NR 29.6   R TIBIAL - AH      Ankle AH 4.64 6.00 0.5 4.0 100 8   7.24 100 31      Pop Fos AH 18.13  0.3  61.5 43 31.9 38.0 9.32 114 31   L TIBIAL - AH      Ankle AH 5.16 6.00 1.3 4.0 100 8   6.20 100 31      Pop Fos AH 18.85  0.3  24.1 42 30.7 38.0 5.05 47.7 29   R PERONEAL - Tib Ant      Fib Head Tib Ant 5.26  2.8  100    8.65 100 31      Knee Tib Ant 7.40  2.9  103 10 46.8  8.75 102 31   L PERONEAL - Tib Ant      Fib Head Tib Ant 4.48  0.3  100    5.42 100 31      Knee Tib Ant 6.56  0.5  175 10 48.0  6.20 204 31       F  Wave      Nerve Min F Lat Max F Lat Mean FLat Temp.    ms ms ms  C   R TIBIAL 66.41 74.84 71.75 31       EMG Summary Table     Spontaneous MUAP Recruitment    IA Fib PSW Fasc H.F. Amp Dur. PPP Pattern   L. TIB ANTERIOR N 3+ 3+ None CRD 1+ 2+ 2+ Sev Reduced to Discrete   L. GASTROCN (MED) N 2+ 2+ 2+ (slow) None N N N N   L. VAST LATERALIS N None None None None 2+ 1+ N N   L. BIC FEM (S HEAD) N None None None None 2+ 2+ N Mod Reduced   L. GLUTEUS MED N 2+ 2+ None CRD 2+ 2+ N Mild-Mod Reduced   R. GASTROCN (MED) N 3+ None None CRD N 2+ 1+ Sev Reduced   R. TIB ANTERIOR N 3+ 3+ None None 2+ 3+ N Mod-SevReduced   R. VAST MEDIALIS N None None None None 2+ 1+ N Mild Reduced   R. ADD LONGUS N None None 2+ (slow) CRD 2+ 1+ N Mild-ModReduced   R. BIC FEM (S HEAD) N 3+ 3+ None CRD 2+ 2+ N Sev Reduced   R. GLUTEUS MED N None None None None 2+ 2+ N Sev Reduced

## 2017-12-15 ENCOUNTER — MEDICAL CORRESPONDENCE (OUTPATIENT)
Dept: MRI IMAGING | Facility: CLINIC | Age: 72
End: 2017-12-15

## 2018-01-04 ENCOUNTER — OFFICE VISIT (OUTPATIENT)
Dept: ORTHOPEDICS | Facility: CLINIC | Age: 73
End: 2018-01-04
Payer: COMMERCIAL

## 2018-01-04 VITALS — WEIGHT: 271 LBS | RESPIRATION RATE: 16 BRPM | HEIGHT: 68 IN | BODY MASS INDEX: 41.07 KG/M2

## 2018-01-04 DIAGNOSIS — M47.12 CERVICAL SPONDYLOSIS WITH MYELOPATHY: ICD-10-CM

## 2018-01-04 DIAGNOSIS — R26.89 BALANCE PROBLEMS: Primary | ICD-10-CM

## 2018-01-04 ASSESSMENT — ENCOUNTER SYMPTOMS
DIZZINESS: 1
DYSURIA: 0
FLANK PAIN: 1
WEAKNESS: 1
DOUBLE VISION: 0
SYNCOPE: 0
TASTE DISTURBANCE: 0
DISTURBANCES IN COORDINATION: 0
NECK PAIN: 0
LOSS OF CONSCIOUSNESS: 0
SINUS CONGESTION: 1
MYALGIAS: 0
PALPITATIONS: 0
EXERCISE INTOLERANCE: 1
MUSCLE WEAKNESS: 1
SLEEP DISTURBANCES DUE TO BREATHING: 0
TROUBLE SWALLOWING: 1
STIFFNESS: 0
EYE PAIN: 1
SMELL DISTURBANCE: 0
EYE IRRITATION: 0
HYPOTENSION: 0
MUSCLE CRAMPS: 1
MEMORY LOSS: 0
NUMBNESS: 1
DIFFICULTY URINATING: 0
LIGHT-HEADEDNESS: 1
HEADACHES: 1
EYE REDNESS: 0
HEMATURIA: 1
HYPERTENSION: 1
ARTHRALGIAS: 0
SEIZURES: 0
SINUS PAIN: 0
NECK MASS: 0
LEG PAIN: 0
HOARSE VOICE: 1
SORE THROAT: 0
ORTHOPNEA: 0
BACK PAIN: 1
SPEECH CHANGE: 0
EYE WATERING: 0
JOINT SWELLING: 0
TINGLING: 1
PARALYSIS: 0
TREMORS: 0

## 2018-01-04 NOTE — LETTER
1/4/2018       RE: Gabriel Hunter  [address]  [place] [address]     Dear Colleague,    Thank you for referring your patient, Gabriel Hunter, to the [facility] at [facility]. Please see a copy of my visit note below.    Spine Surg Hx:      ~1999 - AP fusion L1-2 and L3-4, with unilateral translaminar facet screw fixation; implantation of bone stim (Dr. Dariusz Hill, [facility])      02/25/2013 - LLIF L2-3 ([facility])      S> 72/m, seen last 11/21/17.  Pls refer to previous note for details.  Had previous spine surgeries.  Fusion already solid.  Current complaint mainly is balance issues; frequently falling.  Plan was to get -L MRI, T-L CT, bilat LE EMG's and RTC to review/discuss.    Accompanied by wife; similar symptoms as last visit.  2 days ago, passed bloody urine.  [facility].  Sent home.  Also feels some back/flank tenderness.  Per patient, in the late 80's, was also diagnosed with c-spine problems, and surgery was even recommended at the time.    Unable to get MRI because of retained wires from implantable bone stim.  MRI refused here at [facility].  Per patient, he got hold of [employer] device rep; was an older model; they cannot guarantee that it is MRI compatible, even if the main battery had already been removed, and only a few wires are left in placed.  The thing is: he had previously safely undergone lumbar MRI on 1/15/14 at [facility] along [place], [place]!!!  No adverse effects noted at the time.    Lumbar and thoracic CT done 11/21/17.  (+) solid fusion L1-2,L2-3,L3-4.  (+) thoracic spondylosis; no evidence of instability.  Cannot assess stenosis very well.    Bilat LE EMG done 12/12/17 by Dr. Clarke.  See report.  Not very helpful.  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    VASQUEZ 22/50 or 44%.   PROMIS physical 10, mental  11, total 21.    O> Alert, oriented x 3, cooperative, not in CP distress.  No change vs last visit.  Detailed neuro exam not repeated today.    Imaging:  See above.  No new x-rays obtained today.    A>   - Solid fusion L1-L4.   - S/p LLIF L2-3 (02/2013 [facility]).  - S/p AP fusion L1-2,L3-4 (1999 [facility])  - Chronic lumbar radiculopathy bilateral.  - Balance problems, r/o cervical myelopathy.    P>  Had good discussion with patient and wife.  Need to rule out c-spine etiology of balance problems.  - C-spine MRI (to be done at [facility])  RTC to review/discuss.  If MRI normal, no surgical recs.    TT > 15 min, > 50% CC.        Again, thank you for allowing me to participate in the care of your patient.      Sincerely,    Chepe Urbina MD
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
## 2018-01-04 NOTE — MR AVS SNAPSHOT
After Visit Summary   1/4/2018    Gabriel Hunter    MRN: [id number]           Patient Information     Date Of Birth          1945        Visit Information        Provider Department      1/4/2018 10:20 AM Chepe Urbina MD [facility]        Today's Diagnoses     Balance problems    -  1    Cervical spondylosis with myelopathy           Follow-ups after your visit        Your next 10 appointments already scheduled     Feb 08, 2018  2:40 PM CST   (Arrive by 2:10 PM)   RETURN SPINE with Chepe Urbina MD   [facility] ([facility])    [address] [place] [address]  [address] [facility] [address] [phone]              Future tests that were ordered for you today     Open Future Orders        Priority Expected Expires Ordered    MRI Cervical spine w/o contrast Routine  1/4/2019 1/4/2018            Who to contact     Please call your clinic at [phone] to:    Ask questions about your health    Make or cancel appointments    Discuss your medicines    Learn about your test results    Speak to your doctor   If you have compliments or concerns about an experience at your clinic, or if you wish to file a complaint, please contact [facility] Patient Relations at [phone] or email us at [email or web address]         Additional Information About Your Visit        BombBombhart Information     [employer] gives you secure access to your electronic health record. If you see a primary care provider, you can also send messages to your care team and make appointments. If you have questions, please call your primary care clinic.  If you do not have a primary care provider, please call [phone] and they will assist you.      [employer] is an electronic gateway that provides easy, online access to your medical records. With [employer], you can request a clinic appointment, read your test results, renew a  "prescription or communicate with your care team.     To access your existing account, please contact your [facility] or call [phone] for assistance.        Care EveryWhere ID     This is your Care EveryWhere ID. This could be used by other organizations to access your [facility] medical records  [id number]        Your Vitals Were     Respirations Height BMI (Body Mass Index)             16 1.727 m (5' 8\") 41.21 kg/m2          Blood Pressure from Last 3 Encounters:   No data found for BP    Weight from Last 3 Encounters:   01/04/18 122.9 kg (271 lb)   11/21/17 117.9 kg (260 lb)               Primary Care Provider Office Phone # Fax #    Henrietta Avendano [phone]       [facility] [address] [facility]   [facility] [address]        Equal Access to Services     BITA FUENTES : Hadii khadijah ren hadasho Soomaali, waaxda luqadaha, qaybta kaalmada adeegyada, magdy real . So [place] -[phone].    ATENCIÓN: Si habla español, tiene a francisco disposición servicios gratuitos de asistencia lingüística. Miane al [phone].    We comply with applicable federal civil rights laws and [place] laws. We do not discriminate on the basis of race, color, national origin, age, disability, sex, sexual orientation, or gender identity.            Thank you!     Thank you for choosing [facility]  for your care. Our goal is always to provide you with excellent care. Hearing back from our patients is one way we can continue to improve our services. Please take a few minutes to complete the written survey that you may receive in the mail after your visit with us. Thank you!             Your Updated Medication List - Protect others around you: Learn how to safely use, store and throw away your medicines at www.disposemymeds.org.          This list is accurate as of: 1/4/18 11:36 AM.  Always use your most recent med list.                   Brand Name " Dispense Instructions for use Diagnosis    amLODIPine 5 MG tablet    NORVASC      Arthrodesis status, Balance problems, Lumbar radiculopathy       pantoprazole 40 MG EC tablet    PROTONIX      Arthrodesis status, Balance problems, Lumbar radiculopathy       simvastatin 40 MG tablet    ZOCOR      Arthrodesis status, Balance problems, Lumbar radiculopathy       trospium 20 MG tablet    SANCTURA      Arthrodesis status, Balance problems, Lumbar radiculopathy       venlafaxine 150 MG 24 hr capsule    EFFEXOR-XR      Arthrodesis status, Balance problems, Lumbar radiculopathy

## 2018-01-07 NOTE — PROGRESS NOTES
Spine Surg Hx:      ~1999 - AP fusion L1-2 and L3-4, with unilateral translaminar facet screw fixation; implantation of bone stim (Dr. Dariusz Hill, [facility])      02/25/2013 - LLIF L2-3 ([facility])      S> 72/m, seen last 11/21/17.  Pls refer to previous note for details.  Had previous spine surgeries.  Fusion already solid.  Current complaint mainly is balance issues; frequently falling.  Plan was to get -L MRI, T-L CT, bilat LE EMG's and RTC to review/discuss.    Accompanied by wife; similar symptoms as last visit.  2 days ago, passed bloody urine.  [facility]; [facility]ysis done e/n.  Sent home.  Also feels some back/flank tenderness.  Per patient, in the late 80's, was also diagnosed with c-spine problems, and surgery was even recommended at the time.    Unable to get MRI because of retained wires from implantable bone stim.  MRI refused here at [facility].  Per patient, he got hold of [employer] device rep; was an older model; they cannot guarantee that it is MRI compatible, even if the main battery had already been removed, and only a few wires are left in placed.  The thing is: he had previously safely undergone lumbar MRI on 1/15/14 at [facility] along [place], [place]!!!  No adverse effects noted at the time.    Lumbar and thoracic CT done 11/21/17.  (+) solid fusion L1-2,L2-3,L3-4.  (+) thoracic spondylosis; no evidence of instability.  Cannot assess stenosis very well.    Bilat LE EMG done 12/12/17 by Dr. Clarke.  See report.  Not very helpful.  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    VASQUEZ 22/50 or 44%.   PROMIS physical 10, mental 11, total 21.    O> Alert, oriented x 3, cooperative, not in CP distress.  No change vs last visit.  Detailed neuro exam not repeated today.    Imaging:  See above.  No new x-rays obtained today.    A>   - Solid fusion L1-L4.   - S/p LLIF L2-3 (02/2013 [facility]).  - S/p AP fusion  L1-2,L3-4 (1999 [facility])  - Chronic lumbar radiculopathy bilateral.  - Balance problems, r/o cervical myelopathy.    P>  Had good discussion with patient and wife.  Need to rule out c-spine etiology of balance problems.  - C-spine MRI (to be done at [facility])  RTC to review/discuss.  If MRI normal, no surgical recs.    TT > 15 min, > 50% CC.

## 2018-01-25 ENCOUNTER — TELEPHONE (OUTPATIENT)
Dept: ORTHOPEDICS | Facility: CLINIC | Age: 73
End: 2018-01-25

## 2018-01-25 NOTE — TELEPHONE ENCOUNTER
----- Message from Leann Mcpherson sent at 1/25/2018  3:32 PM CST -----  Regarding: Pt of Dr. Urbina, needs the MRI order faxed to the [facility]  Contact: [phone]  Pt of Dr. Urbina, needs the MRI order faxed to the [facility].  [facility] fax number is [phone], then pt would like a call when this has been done at the number listed above, so he can get it scheduled.    Thank you,    Pau PATINO  Call Ctr    Please DO NOT send this message and/or reply back to sender.  Call Center Representatives DO NOT respond to messages.

## 2018-01-25 NOTE — TELEPHONE ENCOUNTER
Per request below, orders for the MRI were faxed to [facility].  This LPN called patient to let him know that the orders for his MRI were faxed and he can proceed with calling to schedule.    Saray Scherer LPN

## 2018-01-30 ENCOUNTER — TELEPHONE (OUTPATIENT)
Dept: ORTHOPEDICS | Facility: CLINIC | Age: 73
End: 2018-01-30

## 2018-01-30 NOTE — TELEPHONE ENCOUNTER
Orders for C/T/L MRIs faxed to [facility] #[phone]. [facility] through [facility]

## 2018-02-01 ENCOUNTER — TRANSFERRED RECORDS (OUTPATIENT)
Dept: HEALTH INFORMATION MANAGEMENT | Facility: CLINIC | Age: 73
End: 2018-02-01

## 2018-02-05 ASSESSMENT — ENCOUNTER SYMPTOMS
DOUBLE VISION: 0
PARALYSIS: 0
JOINT SWELLING: 0
TREMORS: 0
SORE THROAT: 0
STIFFNESS: 1
SPEECH CHANGE: 0
NUMBNESS: 1
EYE REDNESS: 0
LOSS OF CONSCIOUSNESS: 0
MUSCLE WEAKNESS: 1
TASTE DISTURBANCE: 0
ARTHRALGIAS: 0
DYSURIA: 0
DISTURBANCES IN COORDINATION: 0
EYE PAIN: 0
WEAKNESS: 1
MUSCLE CRAMPS: 1
MEMORY LOSS: 0
SEIZURES: 0
TINGLING: 1
HOARSE VOICE: 1
TROUBLE SWALLOWING: 1
DIFFICULTY URINATING: 0
HEADACHES: 1
SINUS CONGESTION: 0
SINUS PAIN: 0
EYE WATERING: 0
FLANK PAIN: 0
EYE IRRITATION: 0
SMELL DISTURBANCE: 0
MYALGIAS: 0
DIZZINESS: 1
NECK MASS: 0
HEMATURIA: 0
NECK PAIN: 0
BACK PAIN: 1

## 2018-02-06 DIAGNOSIS — M54.2 NECK PAIN: Primary | ICD-10-CM

## 2018-02-08 ENCOUNTER — RADIANT APPOINTMENT (OUTPATIENT)
Dept: GENERAL RADIOLOGY | Facility: CLINIC | Age: 73
End: 2018-02-08
Attending: ORTHOPAEDIC SURGERY
Payer: COMMERCIAL

## 2018-02-08 ENCOUNTER — OFFICE VISIT (OUTPATIENT)
Dept: ORTHOPEDICS | Facility: CLINIC | Age: 73
End: 2018-02-08
Payer: COMMERCIAL

## 2018-02-08 VITALS — WEIGHT: 273 LBS | HEIGHT: 68 IN | BODY MASS INDEX: 41.37 KG/M2 | RESPIRATION RATE: 16 BRPM

## 2018-02-08 DIAGNOSIS — Z98.1 ARTHRODESIS STATUS: ICD-10-CM

## 2018-02-08 DIAGNOSIS — R26.89 BALANCE PROBLEMS: Primary | ICD-10-CM

## 2018-02-08 DIAGNOSIS — M54.2 NECK PAIN: ICD-10-CM

## 2018-02-08 NOTE — PROGRESS NOTES
Reason for Visit:  Chief Complaint   Patient presents with     Follow Up For     Go over MRI done at [facility]        S>   Mr. Hunter is a 72-year-old gentleman is in clinic today for follow-up of his balance issues and continued weakness and numbness in his lower extremities.  Please see previous clinic note for further details on his history.    After some confusion with obtaining the MRI, he was able to obtain a cervical MRI at [facility].  Since that time, he has noticed continued balance issues and feeling as if his legs are weak, especially after standing for prolonged periods of time.  He reports that he is unable to  the grocery line without a cart or cane for longer than a few minutes.  Thankfully he has not fallen recently, but he continues to have weakness and numbness of his bilateral lower extremities.  He reports he has no neck pain, no weakness or lack of coordination in his upper extremities, no upper or lower extremity radicular symptoms, and very stable low back pain.    Oswestry (VASQUEZ) Questionnaire    OSWESTRY DISABILITY INDEX 2/5/2018   SECTION 1-PAIN INTENSITY The pain is moderate at the moment.   SECTION 2-PERSONAL CARE (WASHING,DRESSING,ETC.) I can look after myself normally without causing additional pain.   SECTION 3-LIFTING Pain prevents me from lifting heavy weights but I can manage light to medium weights if they are conveniently positioned.   SECTION 4-WALKING Pain prevents me from walking more than 100 yards.   SECTION 5-SITTING I can sit in any chair as long as I like.   SECTION 6-STANDING Pain prevents me from standing for more than 10 minutes.   SECTION 7-SLEEPING My sleep is occasionally interrupted by pain.   SECTION 8-SEX LIFE (IF APPLICABLE) My sex life is severely restricted by pain.   SECTION 9-SOCIAL LIFE Pain has restricted my social life and I do not go out as often.   SECTION 10-TRAVELING Pain is bad but I am able to manage trips over two hours.   Oswestry Disability Index: Count  "10   VASQUEZ: Total Score = SUM (total for answered questions) 21   Computed Oswestry Score 42 (%)   Some recent data might be hidden          PROMIS-10 Scores  Global Mental Health Score: (P) 12  Global Physical Health Score: (P) 10  PROMIS TOTAL - SUBSCORES: (P) 22    O>   Alert, oriented x 3, cooperative.  Not in CP distress.  Resp 16  Ht 1.727 m (5' 8\")  Wt 123.8 kg (273 lb)  BMI 41.51 kg/m2  Surgical incision well-healed, no sign of infection.  Ambulates independently.   Grossly neurologically intact.  Moderately antalgic gait using cane for support.      Cervical spine:    Appearance - Normal    Palpation -no midline cervical spine tenderness.    ROM -there is relatively limited extension, but there is full flexion.    Motor -     C3-5: Easy diaphragmatic respirations without utilization of accessory muscles     C6: Biceps R and L 5/5 strength     C7: Triceps R and L 5/5 strength     C8:  R and L 5/5 strength     T1: Dorsal interossei R and L 5/5 strength        Sensation: intact to light touch in C5-C8 and L3-S1 distribution      Special Tests -      Lhermitte's Test - Negative     Spurling's Test - Negative     Lumbar Spine:    Appearance -no gross deformities    Palpation -no tenderness palpation over the midline.    Motor -     L2-3: Hip flexion L and R 5/5 strength          L4:  Knee extension L and R 5/5 strength         L5:  Foot dorsiflexion L 4/5, EHL dorsiflexion L 3/5           Foot dorsiflexion R 5/5 strength         S1:  Plantarflexion  L 5/5, R 4/5 strength    Sensation: Altered but present sensation in the saphenous nerve on the right and the deep peroneal nerve distribution on the left.      Special tests -     Straight leg raise - Negative      Neurologic:      REFLEXES Left Right   Patella Diminished Diminished   Ankle jerk 1+ 1+   Clonus 0 beats 0 beats         Imaging:     Cervical spine MRI performed 2/1/2018: There is ventral cord flattening at C6-7 interspace - however there is no " evidence of T2 hyperintensity within the cord at this level.  There is severe bilateral foraminal stenosis at C5-6.  Multiple level bilateral foraminal stenosis at C3 through C5, and C6-7.    Cervical spine x-ray 2/8/2018: Flexion extension views reveal no significant spondylolisthesis of any cervical segment.  There are degenerative changes throughout.    A>  Mr. Hunter is a very pleasant 72-year-old gentleman presenting for follow-up on his balance problems and low back pain.  It is still unclear the etiology of his balance problems.  The cervical MRI showed some ventral cord flattening at C6-7 but no evidence of intra-cord abnormality.  Thus, it is unlikely that without neck pain or myelopathic symptoms, his cervical spine would be the etiology of his balance issues.  We previously attempted to order a thoracic MRI which was declined, but will be necessary to rule out any thoracic cord compression.  We will also reevaluate his lumbar spine and any cord compression at that level.    P>   - Order thoracic and lumbar MRIs without contrast, to be completed at [facility] as they have previously successfully imaged the patient with his wires in place.  - Referral to adult neurology for workup of his balance issues and continued lower extremity weakness and numbness.  - We will schedule follow-up after thoracic and lumbar MRIs are complete.  -No current surgical plan at this time.  If he were to develop upper extremity myelopathy, could consider C5-6 ACDF versus decompression and posterior spinal fusion if clinical findings were consistent.    Patient seen and discussed with Dr. Urbina.    Regino Marquez MD  Orthopaedic Surgery PGY-1  #: [phone]      Attestation:  I personally saw and evaluated patient with PGY-1 [place], and I agree with findings and plan outlined in the note.  Seen 2x before, pls see previous note.  Here to review c-spine MRI (done at [facility]); obtained to evaluate balance issues (r/o  myelopathy).  Cervical MRI shows some mild stenosis, manjinder at C6-7, but no myelomalacia, findings not likely to produce gait problems.  Clinically, suspicion for c-spine problem is low, because no neck pain, no shooting arm pain/numbness, no hyperreflexia.    Balance problems worse when looking up.  [facility], but may have been for different issue.  Also having claudication-type symptoms - worse with standing/walking; can only walk 1/2 block; needs to sit; (+) shopping cart sign.    P> With current information, I don't have good explanation for balance problems.  Cannot recommend spine surgery.  I suspect may be having symptoms from subjacent levels L4-5 and L5-S1.  Although disc heights are not bad; and no spondylolisthesis; CT shows advanced facet arthrosis with air/vacuum inside facet joints bilaterally at both L4-5 and L5-S1.  These may be associated with subarticular stenosis, which would fit patient description of symptoms.  May explain claudication, but not balance problems.    - Thoracic and lumbar MRI.  - Neurology evaluation for balance problems.  - RTC after above to discuss.    TT > 15 mins, > 50% CC.

## 2018-02-08 NOTE — LETTER
2/8/2018       RE: Gabriel Hunter  [address]  [place] [address]     Dear Colleague,    Thank you for referring your patient, Gabriel Hunter, to the [facility] at [facility]. Please see a copy of my visit note below.    Reason for Visit:  Chief Complaint   Patient presents with     Follow Up For     Go over MRI done at [facility]        S>   Mr. Hunter is a 72-year-old gentleman is in clinic today for follow-up of his balance issues and continued weakness and numbness in his lower extremities.  Please see previous clinic note for further details on his history.    After some confusion with obtaining the MRI, he was able to obtain a cervical MRI at [facility].  Since that time, he has noticed continued balance issues and feeling as if his legs are weak, especially after standing for prolonged periods of time.  He reports that he is unable to  the grocery line without a cart or cane for longer than a few minutes.  Thankfully he has not fallen recently, but he continues to have weakness and numbness of his bilateral lower extremities.  He reports he has no neck pain, no weakness or lack of coordination in his upper extremities, no upper or lower extremity radicular symptoms, and very stable low back pain.    Oswestry (VASQUEZ) Questionnaire    OSWESTRY DISABILITY INDEX 2/5/2018   SECTION 1-PAIN INTENSITY The pain is moderate at the moment.   SECTION 2-PERSONAL CARE (WASHING,DRESSING,ETC.) I can look after myself normally without causing additional pain.   SECTION 3-LIFTING Pain prevents me from lifting heavy weights but I can manage light to medium weights if they are conveniently positioned.   SECTION 4-WALKING Pain prevents me from walking more than 100 yards.   SECTION 5-SITTING I can sit in any chair as long as I like.   SECTION 6-STANDING Pain prevents me from standing for more than 10 minutes.   SECTION 7-SLEEPING My sleep is occasionally interrupted by  "pain.   SECTION 8-SEX LIFE (IF APPLICABLE) My sex life is severely restricted by pain.   SECTION 9-SOCIAL LIFE Pain has restricted my social life and I do not go out as often.   SECTION 10-TRAVELING Pain is bad but I am able to manage trips over two hours.   Oswestry Disability Index: Count 10   VASQUEZ: Total Score = SUM (total for answered questions) 21   Computed Oswestry Score 42 (%)   Some recent data might be hidden          PROMIS-10 Scores  Global Mental Health Score: (P) 12  Global Physical Health Score: (P) 10  PROMIS TOTAL - SUBSCORES: (P) 22    O>   Alert, oriented x 3, cooperative.  Not in CP distress.  Resp 16  Ht 1.727 m (5' 8\")  Wt 123.8 kg (273 lb)  BMI 41.51 kg/m2  Surgical incision well-healed, no sign of infection.  Ambulates independently.   Grossly neurologically intact.  Moderately antalgic gait using cane for support.      Cervical spine:    Appearance - Normal    Palpation -no midline cervical spine tenderness.    ROM -there is relatively limited extension, but there is full flexion.    Motor -     C3-5: Easy diaphragmatic respirations without utilization of accessory muscles     C6: Biceps R and L 5/5 strength     C7: Triceps R and L 5/5 strength     C8:  R and L 5/5 strength     T1: Dorsal interossei R and L 5/5 strength        Sensation: intact to light touch in C5-C8 and L3-S1 distribution      Special Tests -      Lhermitte's Test - Negative     Spurling's Test - Negative     Lumbar Spine:    Appearance -no gross deformities    Palpation -no tenderness palpation over the midline.    Motor -     L2-3: Hip flexion L and R 5/5 strength          L4:  Knee extension L and R 5/5 strength         L5:  Foot dorsiflexion L 4/5, EHL dorsiflexion L 3/5           Foot dorsiflexion R 5/5 strength         S1:  Plantarflexion  L 5/5, R 4/5 strength    Sensation: Altered but present sensation in the saphenous nerve on the right and the deep peroneal nerve distribution on the left.      Special tests " -     Straight leg raise - Negative      Neurologic:      REFLEXES Left Right   Patella Diminished Diminished   Ankle jerk 1+ 1+   Clonus 0 beats 0 beats         Imaging:     Cervical spine MRI performed 2/1/2018: There is ventral cord flattening at C6-7 interspace - however there is no evidence of T2 hyperintensity within the cord at this level.  There is severe bilateral foraminal stenosis at C5-6.  Multiple level bilateral foraminal stenosis at C3 through C5, and C6-7.    Cervical spine x-ray 2/8/2018: Flexion extension views reveal no significant spondylolisthesis of any cervical segment.  There are degenerative changes throughout.    A>  Mr. Hunter is a very pleasant 72-year-old gentleman presenting for follow-up on his balance problems and low back pain.  It is still unclear the etiology of his balance problems.  The cervical MRI showed some ventral cord flattening at C6-7 but no evidence of intra-cord abnormality.  Thus, it is unlikely that without neck pain or myelopathic symptoms, his cervical spine would be the etiology of his balance issues.  We previously attempted to order a thoracic MRI which was declined, but will be necessary to rule out any thoracic cord compression.  We will also reevaluate his lumbar spine and any cord compression at that level.    P>   - Order thoracic and lumbar MRIs without contrast, to be completed at [facility] as they have previously successfully imaged the patient with his wires in place.  - Referral to adult neurology for workup of his balance issues and continued lower extremity weakness and numbness.  - We will schedule follow-up after thoracic and lumbar MRIs are complete.  -No current surgical plan at this time.  If he were to develop upper extremity myelopathy, could consider C5-6 ACDF versus decompression and posterior spinal fusion if clinical findings were consistent.    Patient seen and discussed with Dr. Urbina.    Regino Marquez MD  Orthopaedic Surgery PGY-1  #:  [phone]      Attestation:  I personally saw and evaluated patient with PGY-1 [place], and I agree with findings and plan outlined in the note.  Seen 2x before, pls see previous note.  Here to review c-spine MRI (done at [facility]); obtained to evaluate balance issues (r/o myelopathy).  Cervical MRI shows some mild stenosis, manjinder at C6-7, but no myelomalacia, findings not likely to produce gait problems.  Clinically, suspicion for c-spine problem is low, because no neck pain, no shooting arm pain/numbness, no hyperreflexia.    Balance problems worse when looking up.  [facility] but may have been for different issue.  Also having claudication-type symptoms - worse with standing/walking; can only walk 1/2 block; needs to sit; (+) shopping cart sign.    P> With current information, I don't have good explanation for balance problems.  Cannot recommend spine surgery.  I suspect may be having symptoms from subjacent levels L4-5 and L5-S1.  Although disc heights are not bad; and no spondylolisthesis; CT shows advanced facet arthrosis with air/vacuum inside facet joints bilaterally at both L4-5 and L5-S1.  These may be associated with subarticular stenosis, which would fit patient description of symptoms.  May explain claudication, but not balance problems.    - Thoracic and lumbar MRI.  - Neurology evaluation for balance problems.  - RTC after above to discuss.    TT > 15 mins, > 50% CC.    Chepe Urbina MD

## 2018-02-08 NOTE — MR AVS SNAPSHOT
After Visit Summary   2/8/2018    Gabriel Hunter    MRN: [id number]           Patient Information     Date Of Birth          1945        Visit Information        Provider Department      2/8/2018 2:40 PM Chepe Urbina MD [facility]        Today's Diagnoses     Balance problems    -  1       Follow-ups after your visit        Additional Services     NEUROLOGY ADULT REFERRAL       Your provider has referred you for the following:   [facility]    Please be aware that coverage of these services is subject to the terms and limitations of your health insurance plan.  Call member services at your health plan with any benefit or coverage questions.      Please bring the following with you to your appointment:    (1) Any X-Rays, CTs or MRIs which have been performed.  Contact the facility where they were done to arrange for  prior to your scheduled appointment.    (2) List of current medications  (3) This referral request   (4) Any documents/labs given to you for this referral                  Your next 10 appointments already scheduled     Feb 26, 2018  9:30 AM CST   (Arrive by 9:15 AM)   New Patient Visit with SAMIR Zabala CNP   [facility] ([facility])    [address] [place] [id number] [phone]              Future tests that were ordered for you today     Open Future Orders        Priority Expected Expires Ordered    MRI Thoracic spine w/o contrast Routine  2/8/2019 2/8/2018    MRI Lumbar spine w/o contrast Routine  2/8/2019 2/8/2018            Who to contact     Please call your clinic at [phone] to:    Ask questions about your health    Make or cancel appointments    Discuss your medicines    Learn about your test results    Speak to your doctor            Additional Information About Your Visit        MyChart Information     [employer] gives you secure access  "to your electronic health record. If you see a primary care provider, you can also send messages to your care team and make appointments. If you have questions, please call your primary care clinic.  If you do not have a primary care provider, please call [phone] and they will assist you.      [employer] is an electronic gateway that provides easy, online access to your medical records. With [employer], you can request a clinic appointment, read your test results, renew a prescription or communicate with your care team.     To access your existing account, please contact your [facility] or call [phone] for assistance.        Care EveryWhere ID     This is your Care EveryWhere ID. This could be used by other organizations to access your [place] medical records  [id number]        Your Vitals Were     Respirations Height BMI (Body Mass Index)             16 1.727 m (5' 8\") 41.51 kg/m2          Blood Pressure from Last 3 Encounters:   No data found for BP    Weight from Last 3 Encounters:   02/08/18 123.8 kg (273 lb)   01/04/18 122.9 kg (271 lb)   11/21/17 117.9 kg (260 lb)              We Performed the Following     NEUROLOGY ADULT REFERRAL        Primary Care Provider Office Phone # Fax #    Tuuka A Romana [phone]       [facility] [address]   [facility] [address]        Equal Access to Services     BITA FUENTES : Hadii khadijah ku hadasho Soomaali, waaxda luqadaha, qaybta kaalmada adeegyada, magdy butler. So [facility] [phone].    ATENCIÓN: Si habla español, tiene a francisco disposición servicios gratuitos de asistencia lingüística. Llame al [phone].    We comply with applicable federal civil rights laws and [place] laws. We do not discriminate on the basis of race, color, national origin, age, disability, sex, sexual orientation, or gender identity.            Thank you!     Thank you for choosing [facility]  " for your care. Our goal is always to provide you with excellent care. Hearing back from our patients is one way we can continue to improve our services. Please take a few minutes to complete the written survey that you may receive in the mail after your visit with us. Thank you!             Your Updated Medication List - Protect others around you: Learn how to safely use, store and throw away your medicines at www.disposemymeds.org.          This list is accurate as of 2/8/18  4:34 PM.  Always use your most recent med list.                   Brand Name Dispense Instructions for use Diagnosis    amLODIPine 5 MG tablet    NORVASC      Arthrodesis status, Balance problems, Lumbar radiculopathy       pantoprazole 40 MG EC tablet    PROTONIX      Arthrodesis status, Balance problems, Lumbar radiculopathy       simvastatin 40 MG tablet    ZOCOR      Arthrodesis status, Balance problems, Lumbar radiculopathy       trospium 20 MG tablet    SANCTURA      Arthrodesis status, Balance problems, Lumbar radiculopathy       venlafaxine 150 MG 24 hr capsule    EFFEXOR-XR      Arthrodesis status, Balance problems, Lumbar radiculopathy
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
## 2018-02-15 NOTE — TELEPHONE ENCOUNTER
APPT INFO    Date /Time: 2/26/18, 9:30am   Reason for Appt: Balance problems   Ref Provider/Clinic: [facility]   Are there internal records? Yes/No?  IF YES, list clinic names: Orthopaedic Surgery   Are there outside records? Yes/No? No    Patient Contact (Y/N) & Call Details: No, referred    Action:

## 2018-02-22 ENCOUNTER — TELEPHONE (OUTPATIENT)
Dept: ORTHOPEDICS | Facility: CLINIC | Age: 73
End: 2018-02-22

## 2018-02-22 NOTE — TELEPHONE ENCOUNTER
Patient calling back after receiving voicemail from Shanae PADRON Rn.  He stated he had an MRI and was to be set up for some follow up visits.  Message sent to Shanae PADRON to return patient's call when able, phone number on chart confirmed.

## 2018-02-26 ENCOUNTER — PRE VISIT (OUTPATIENT)
Dept: NEUROLOGY | Facility: CLINIC | Age: 73
End: 2018-02-26

## 2018-02-27 ENCOUNTER — TELEPHONE (OUTPATIENT)
Dept: ORTHOPEDICS | Facility: CLINIC | Age: 73
End: 2018-02-27

## 2018-02-27 NOTE — TELEPHONE ENCOUNTER
reviewed 2 spine MRI;s & stated they do not look as bad as he thought they would & yes he needs Neurology consult as ordered before RTC appt. 3-20-18.  Pt. Agrees & [facility]rology [facility] & then he will [facility] appt.   Call back prn.  Pt. Agreed.  GUY./Shanae Carlin RN.

## 2018-03-05 ASSESSMENT — ENCOUNTER SYMPTOMS
ARTHRALGIAS: 0
SEIZURES: 0
TROUBLE SWALLOWING: 0
MEMORY LOSS: 0
SORE THROAT: 0
STIFFNESS: 0
NUMBNESS: 1
PARALYSIS: 0
TASTE DISTURBANCE: 0
DYSURIA: 0
LOSS OF CONSCIOUSNESS: 0
HOARSE VOICE: 0
WEAKNESS: 1
NECK MASS: 0
SINUS CONGESTION: 0
MUSCLE WEAKNESS: 0
DISTURBANCES IN COORDINATION: 0
MUSCLE CRAMPS: 1
TINGLING: 1
SPEECH CHANGE: 0
JOINT SWELLING: 0
SINUS PAIN: 0
HEADACHES: 1
MYALGIAS: 0
SMELL DISTURBANCE: 0
HEMATURIA: 0
DIZZINESS: 1
FLANK PAIN: 0
TREMORS: 0
NECK PAIN: 0
BACK PAIN: 1

## 2018-03-12 ENCOUNTER — OFFICE VISIT (OUTPATIENT)
Dept: NEUROLOGY | Facility: CLINIC | Age: 73
End: 2018-03-12
Payer: COMMERCIAL

## 2018-03-12 ENCOUNTER — OFFICE VISIT (OUTPATIENT)
Dept: NEUROLOGY | Facility: CLINIC | Age: 73
End: 2018-03-12

## 2018-03-12 VITALS — HEIGHT: 68 IN | DIASTOLIC BLOOD PRESSURE: 73 MMHG | HEART RATE: 62 BPM | SYSTOLIC BLOOD PRESSURE: 146 MMHG

## 2018-03-12 DIAGNOSIS — R26.89 BALANCE PROBLEMS: ICD-10-CM

## 2018-03-12 DIAGNOSIS — M54.16 LUMBAR RADICULOPATHY: ICD-10-CM

## 2018-03-12 DIAGNOSIS — R27.0 ATAXIA: ICD-10-CM

## 2018-03-12 DIAGNOSIS — G60.3 IDIOPATHIC PROGRESSIVE POLYNEUROPATHY: ICD-10-CM

## 2018-03-12 DIAGNOSIS — H81.12 BENIGN PAROXYSMAL POSITIONAL VERTIGO OF LEFT EAR: ICD-10-CM

## 2018-03-12 DIAGNOSIS — C61 MALIGNANT NEOPLASM OF PROSTATE (H): ICD-10-CM

## 2018-03-12 DIAGNOSIS — M54.16 LUMBAR RADICULOPATHY: Primary | ICD-10-CM

## 2018-03-12 DIAGNOSIS — G60.3 IDIOPATHIC PROGRESSIVE POLYNEUROPATHY: Primary | ICD-10-CM

## 2018-03-12 LAB
ALBUMIN SERPL-MCNC: 3.5 G/DL (ref 3.4–5)
ALP SERPL-CCNC: 72 U/L (ref 40–150)
ALT SERPL W P-5'-P-CCNC: 33 U/L (ref 0–70)
ANION GAP SERPL CALCULATED.3IONS-SCNC: 6 MMOL/L (ref 3–14)
AST SERPL W P-5'-P-CCNC: 24 U/L (ref 0–45)
BASOPHILS # BLD AUTO: 0 10E9/L (ref 0–0.2)
BASOPHILS NFR BLD AUTO: 0.8 %
BILIRUB SERPL-MCNC: 0.4 MG/DL (ref 0.2–1.3)
BUN SERPL-MCNC: 18 MG/DL (ref 7–30)
CALCIUM SERPL-MCNC: 8.4 MG/DL (ref 8.5–10.1)
CHLORIDE SERPL-SCNC: 105 MMOL/L (ref 94–109)
CO2 SERPL-SCNC: 27 MMOL/L (ref 20–32)
CREAT SERPL-MCNC: 1.18 MG/DL (ref 0.66–1.25)
CRP SERPL-MCNC: 6.8 MG/L (ref 0–8)
DIFFERENTIAL METHOD BLD: NORMAL
EOSINOPHIL # BLD AUTO: 0.1 10E9/L (ref 0–0.7)
EOSINOPHIL NFR BLD AUTO: 2.6 %
ERYTHROCYTE [DISTWIDTH] IN BLOOD BY AUTOMATED COUNT: 13.2 % (ref 10–15)
ERYTHROCYTE [SEDIMENTATION RATE] IN BLOOD BY WESTERGREN METHOD: 8 MM/H (ref 0–20)
GFR SERPL CREATININE-BSD FRML MDRD: 60 ML/MIN/1.7M2
GLUCOSE SERPL-MCNC: 105 MG/DL (ref 70–99)
HCT VFR BLD AUTO: 46.7 % (ref 40–53)
HGB BLD-MCNC: 15.6 G/DL (ref 13.3–17.7)
IMM GRANULOCYTES # BLD: 0 10E9/L (ref 0–0.4)
IMM GRANULOCYTES NFR BLD: 0.4 %
LYMPHOCYTES # BLD AUTO: 1.2 10E9/L (ref 0.8–5.3)
LYMPHOCYTES NFR BLD AUTO: 23.3 %
MCH RBC QN AUTO: 31.3 PG (ref 26.5–33)
MCHC RBC AUTO-ENTMCNC: 33.4 G/DL (ref 31.5–36.5)
MCV RBC AUTO: 94 FL (ref 78–100)
MONOCYTES # BLD AUTO: 0.5 10E9/L (ref 0–1.3)
MONOCYTES NFR BLD AUTO: 9.8 %
NEUTROPHILS # BLD AUTO: 3.4 10E9/L (ref 1.6–8.3)
NEUTROPHILS NFR BLD AUTO: 63.1 %
NRBC # BLD AUTO: 0 10*3/UL
NRBC BLD AUTO-RTO: 0 /100
PLATELET # BLD AUTO: 182 10E9/L (ref 150–450)
POTASSIUM SERPL-SCNC: 4.3 MMOL/L (ref 3.4–5.3)
PROT SERPL-MCNC: 7.2 G/DL (ref 6.8–8.8)
RBC # BLD AUTO: 4.98 10E12/L (ref 4.4–5.9)
SODIUM SERPL-SCNC: 138 MMOL/L (ref 133–144)
T4 FREE SERPL-MCNC: 0.94 NG/DL (ref 0.76–1.46)
TSH SERPL DL<=0.005 MIU/L-ACNC: 2.33 MU/L (ref 0.4–4)
VIT B12 SERPL-MCNC: 449 PG/ML (ref 193–986)
WBC # BLD AUTO: 5.3 10E9/L (ref 4–11)

## 2018-03-12 ASSESSMENT — PAIN SCALES - GENERAL: PAINLEVEL: NO PAIN (0)

## 2018-03-12 NOTE — MR AVS SNAPSHOT
"              After Visit Summary   3/12/2018    Gabriel Hunter    MRN: [id number]           Patient Information     Date Of Birth          1945        Visit Information        Provider Department      3/12/2018 8:00 AM Vazquez Jamil MD [facility]        Today's Diagnoses     Lumbar radiculopathy    -  1    Balance problems        Benign paroxysmal positional vertigo of left ear        Idiopathic progressive polyneuropathy        Malignant neoplasm of prostate (H)           Follow-ups after your visit        Additional Services     PHYSICAL THERAPY REFERRAL       *This therapy referral will be filtered to a centralized scheduling office at [facility] and the patient will receive a call to schedule an appointment at a [facility] location most convenient for them. *     [facility] provides Physical Therapy evaluation and treatment and many specialty services across the [facility] system.  If requesting a specialty program, please choose from the list below.    If you have not heard from the scheduling office within 2 business days, please call [phone] for all locations, with the exception of [place], please call [phone] and [facility], please call [phone]  Treatment: Evaluation & Treatment  Special Instructions/Modalities:eval and tyreat  Special Programs: Balance/Vestibular    Please be aware that coverage of these services is subject to the terms and limitations of your health insurance plan.  Call member services at your health plan with any benefit or coverage questions.      **Note to Provider:  If you are referring outside of [facility] for the therapy appointment, please list the name of the location in the \"special instructions\" above, print the referral and give to the patient to schedule the appointment.                  Your next 10 appointments already scheduled     Mar 12, 2018  9:45 AM T   LAB with [facility] ([facility] " [facility])    [address] [place] [id number] [phone]           Please do not eat 10-12 hours before your appointment if you are coming in fasting for labs on lipids, cholesterol, or glucose (sugar). This does not apply to pregnant women. Water, hot tea and black coffee (with nothing added) are okay. Do not drink other fluids, diet soda or chew gum.            Mar 19, 2018  7:45 PM CDT   (Arrive by 7:30 PM)   MR BRAIN W/O & W CONTRAST with [address] MRI ([facility])    [address] [place] [id number] [phone]           Take your medicines as usual, unless your doctor tells you not to. Bring a list of your current medicines to your exam (including vitamins, minerals and over-the-counter drugs).  You may or may not receive intravenous (IV) contrast for this exam pending the discretion of the Radiologist.  You do not need to do anything special to prepare.  The MRI machine uses a strong magnet. Please wear clothes without metal (snaps, zippers). A sweatsuit works well, or we may give you a hospital gown.  Please remove any body piercings and hair extensions before you arrive. You will also remove watches, jewelry, hairpins, wallets, dentures, partial dental plates and hearing aids. You may wear contact lenses, and you may be able to wear your rings. We have a safe place to keep your personal items, but it is safer to leave them at home.  **IMPORTANT** THE INSTRUCTIONS BELOW ARE ONLY FOR THOSE PATIENTS WHO HAVE BEEN PRESCRIBED SEDATION OR GENERAL ANESTHESIA DURING THEIR MRI PROCEDURE:  IF YOUR DOCTOR PRESCRIBED ORAL SEDATION (take medicine to help you relax during your exam):   You must get the medicine from your doctor (oral medication) before you arrive. Bring the medicine to the exam. Do not take it at home. You ll be told when to take it upon arriving for your exam.   Arrive one  hour early. Bring someone who can take you home after the test. Your medicine will make you sleepy. After the exam, you may not drive, take a bus or take a taxi by yourself.  IF YOUR DOCTOR PRESCRIBED IV SEDATION:   Arrive one hour early. Bring someone who can take you home after the test. Your medicine will make you sleepy. After the exam, you may not drive, take a bus or take a taxi by yourself.   No eating 6 hours before your exam. You may have clear liquids up until 4 hours before your exam. (Clear liquids include water, clear tea, black coffee and fruit juice without pulp.)  IF YOUR DOCTOR PRESCRIBED ANESTHESIA (be asleep for your exam):   Arrive 1 1/2 hours early. Bring someone who can take you home after the test. You may not drive, take a bus or take a taxi by yourself.   No eating 8 hours before your exam. You may have clear liquids up until 4 hours before your exam. (Clear liquids include water, clear tea, black coffee and fruit juice without pulp.)   You will spend four to five hours in the recovery room.  Please call the Imaging Department at your exam site with any questions.            Mar 20, 2018 10:15 AM CDT   (Arrive by 9:45 AM)   RETURN SPINE with Chepe Urbina MD   [facility] ([facility])    [address] [place] [id number] [phone]            Apr 17, 2018 10:00 AM CDT   (Arrive by 9:45 AM)   Return Visit with Vazquez Jamil MD   [facility] ([facility])    [address] [place] [id number] [phone]              Future tests that were ordered for you today     Open Future Orders        Priority Expected Expires Ordered    MRI Brain w & w/o contrast Routine 3/12/2018 3/12/2019 3/12/2018    Erythrocyte sedimentation rate auto Routine 3/17/2018 3/12/2019 3/12/2018    CRP inflammation Routine  3/12/2019 3/12/2018    CBC with platelets differential  "Routine 3/17/2018 3/12/2019 3/12/2018    Comprehensive metabolic panel Routine 3/17/2018 3/12/2019 3/12/2018    Methylmalonic acid Routine 3/17/2018 3/12/2019 3/12/2018    Vitamin B12 Routine 3/17/2018 3/12/2019 3/12/2018    TSH Routine 3/17/2018 3/12/2019 3/12/2018    T4 free Routine 3/17/2018 3/12/2019 3/12/2018    Protein Immunofixation Serum Routine 3/17/2018 3/12/2019 3/12/2018    Protein immunofixation urine Routine 3/17/2018 3/12/2019 3/12/2018    Paraneoplastic antibody Routine 3/17/2018 3/12/2019 3/12/2018            Who to contact     Please call your clinic at [phone] to:    Ask questions about your health    Make or cancel appointments    Discuss your medicines    Learn about your test results    Speak to your doctor            Additional Information About Your Visit        [employer] Information     [employer] gives you secure access to your electronic health record. If you see a primary care provider, you can also send messages to your care team and make appointments. If you have questions, please call your primary care clinic.  If you do not have a primary care provider, please call [phone] and they will assist you.      [employer] is an electronic gateway that provides easy, online access to your medical records. With [employer], you can request a clinic appointment, read your test results, renew a prescription or communicate with your care team.     To access your existing account, please contact your [facility] or call [phone] for assistance.        Care EveryWhere ID     This is your Care EveryWhere ID. This could be used by other organizations to access your [place] medical records  [id number]        Your Vitals Were     Pulse Height                62 1.727 m (5' 8\")           Blood Pressure from Last 3 Encounters:   03/12/18 146/73    Weight from Last 3 Encounters:   02/08/18 123.8 kg (273 lb)   01/04/18 122.9 kg (271 lb)   11/21/17 117.9 kg (260 lb)         "      We Performed the Following     PHYSICAL THERAPY REFERRAL        Primary Care Provider Office Phone # Fax #    Henrietta Avendano [phone]       [facility] [address]   [facility] [address]        Equal Access to Services     BITA BUTLER: Hadii aad ku hadleonelo Sohollyali, waaxda luqadaha, qaybta kaalmada adeegyada, magdy ruvalcaba laMykeseymour butler. So [facility] [phone].    ATENCIÓN: Si habla español, tiene a francisco disposición servicios gratuitos de asistencia lingüística. Llame al [phone].    We comply with applicable federal civil rights laws and [place] laws. We do not discriminate on the basis of race, color, national origin, age, disability, sex, sexual orientation, or gender identity.            Thank you!     Thank you for choosing [facility]  for your care. Our goal is always to provide you with excellent care. Hearing back from our patients is one way we can continue to improve our services. Please take a few minutes to complete the written survey that you may receive in the mail after your visit with us. Thank you!             Your Updated Medication List - Protect others around you: Learn how to safely use, store and throw away your medicines at www.disposemymeds.org.          This list is accurate as of 3/12/18  9:42 AM.  Always use your most recent med list.                   Brand Name Dispense Instructions for use Diagnosis    amLODIPine 5 MG tablet    NORVASC      Arthrodesis status, Balance problems, Lumbar radiculopathy       pantoprazole 40 MG EC tablet    PROTONIX      Arthrodesis status, Balance problems, Lumbar radiculopathy       simvastatin 40 MG tablet    ZOCOR      Arthrodesis status, Balance problems, Lumbar radiculopathy       trospium 20 MG tablet    SANCTURA      Arthrodesis status, Balance problems, Lumbar radiculopathy       venlafaxine 150 MG 24 hr capsule    EFFEXOR-XR      Arthrodesis status, Balance problems, Lumbar radiculopathy

## 2018-03-12 NOTE — MR AVS SNAPSHOT
After Visit Summary   3/12/2018    Gabriel Hunter    MRN: [id number]           Patient Information     Date Of Birth          1945        Visit Information        Provider Department      3/12/2018 12:28 PM Vazquez Jamil MD [facility] Neurology        Today's Diagnoses     Idiopathic progressive polyneuropathy    -  1    Ataxia           Follow-ups after your visit        Your next 10 appointments already scheduled     Apr 04, 2018  8:45 AM CDT   Neuro Eval with Shanae Narayan, PT   [facility] ([facility])    [address] [facility] [address]   [phone]            Apr 17, 2018 10:00 AM CDT   (Arrive by 9:45 AM)   Return Visit with Vazquez Jamil MD   [facility] Neurology ([facility] [place])    [address] [place] [address] [phone]              Who to contact     Please call your clinic at [phone] to:    Ask questions about your health    Make or cancel appointments    Discuss your medicines    Learn about your test results    Speak to your doctor            Additional Information About Your Visit        Primadeskhart Information     Vivonet gives you secure access to your electronic health record. If you see a primary care provider, you can also send messages to your care team and make appointments. If you have questions, please call your primary care clinic.  If you do not have a primary care provider, please call [phone] and they will assist you.      Vivonet is an electronic gateway that provides easy, online access to your medical records. With [employer], you can request a clinic appointment, read your test results, renew a prescription or communicate with your care team.     To access your existing account, please contact your [facility] or call [phone] for assistance.        Care EveryWhere ID     This is your Care EveryWhere ID.  This could be used by other organizations to access your [place] medical records  [id number]         Blood Pressure from Last 3 Encounters:   03/12/18 146/73    Weight from Last 3 Encounters:   03/20/18 124.7 kg (275 lb)   02/08/18 123.8 kg (273 lb)   01/04/18 122.9 kg (271 lb)              Today, you had the following     No orders found for display       Primary Care Provider Office Phone # Fax #    Henrietta Avendano [phone]       [facility] [address] [facility]   [employer] [address]        Equal Access to Services     [facility]: Hadii aad ku hadasho Soomaali, waaxda luqadaha, qaybta kaalmada adetongyanick, magdy real . So [facility] [phone].    ATENCIÓN: Si habla español, tiene a francisco disposición servicios gratuitos de asistencia lingüística. [facility] [phone].    We comply with applicable federal civil rights laws and [place] laws. We do not discriminate on the basis of race, color, national origin, age, disability, sex, sexual orientation, or gender identity.            Thank you!     Thank you for choosing [facility] NEUROLOGY  for your care. Our goal is always to provide you with excellent care. Hearing back from our patients is one way we can continue to improve our services. Please take a few minutes to complete the written survey that you may receive in the mail after your visit with us. Thank you!             Your Updated Medication List - Protect others around you: Learn how to safely use, store and throw away your medicines at www.disposemymeds.org.          This list is accurate as of 3/12/18 11:59 PM.  Always use your most recent med list.                   Brand Name Dispense Instructions for use Diagnosis    amLODIPine 5 MG tablet    NORVASC      Arthrodesis status, Balance problems, Lumbar radiculopathy       pantoprazole 40 MG EC tablet    PROTONIX      Arthrodesis status, Balance problems, Lumbar radiculopathy       simvastatin 40 MG tablet     ZOCOR      Arthrodesis status, Balance problems, Lumbar radiculopathy       trospium 20 MG tablet    SANCTURA      Arthrodesis status, Balance problems, Lumbar radiculopathy       venlafaxine 150 MG 24 hr capsule    EFFEXOR-XR      Arthrodesis status, Balance problems, Lumbar radiculopathy
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
## 2018-03-12 NOTE — LETTER
3/12/2018       RE: Gabriel Hunter  [address]  [place] [address]     Dear Colleague,    Thank you for referring your patient, Gabriel Hunter, to the [facility] NEUROLOGY at [facility]. Please see a copy of my visit note below.    HPI      ROS      Physical Exam    Again, thank you for allowing me to participate in the care of your patient.      Sincerely,    Vazquez Jamil MD

## 2018-03-13 LAB
IGA SERPL-MCNC: 295 MG/DL (ref 70–380)
IGG SERPL-MCNC: 1140 MG/DL (ref 695–1620)
IGM SERPL-MCNC: 87 MG/DL (ref 60–265)
PROT ELPH PNL UR ELPH: NORMAL
PROT PATTERN SERPL IFE-IMP: NORMAL

## 2018-03-14 LAB — METHYLMALONATE SERPL-SCNC: 0.25 UMOL/L (ref 0–0.4)

## 2018-03-19 ENCOUNTER — RADIANT APPOINTMENT (OUTPATIENT)
Dept: MRI IMAGING | Facility: CLINIC | Age: 73
End: 2018-03-19
Attending: PSYCHIATRY & NEUROLOGY
Payer: COMMERCIAL

## 2018-03-19 DIAGNOSIS — H81.12 BENIGN PAROXYSMAL POSITIONAL VERTIGO OF LEFT EAR: ICD-10-CM

## 2018-03-19 DIAGNOSIS — R26.89 BALANCE PROBLEMS: ICD-10-CM

## 2018-03-19 DIAGNOSIS — C61 MALIGNANT NEOPLASM OF PROSTATE (H): ICD-10-CM

## 2018-03-19 RX ORDER — GADOBUTROL 604.72 MG/ML
10 INJECTION INTRAVENOUS ONCE
Status: COMPLETED | OUTPATIENT
Start: 2018-03-19 | End: 2018-03-19

## 2018-03-19 RX ADMIN — GADOBUTROL 10 ML: 604.72 INJECTION INTRAVENOUS at 19:38

## 2018-03-19 ASSESSMENT — ENCOUNTER SYMPTOMS
MYALGIAS: 0
SPEECH CHANGE: 0
SINUS PAIN: 0
NECK PAIN: 0
POLYDIPSIA: 0
TASTE DISTURBANCE: 0
PARALYSIS: 0
DIZZINESS: 1
WEIGHT GAIN: 1
SINUS CONGESTION: 0
HEADACHES: 0
STIFFNESS: 0
NUMBNESS: 1
ARTHRALGIAS: 0
SEIZURES: 0
DIFFICULTY URINATING: 0
FEVER: 0
CHILLS: 0
SORE THROAT: 0
HALLUCINATIONS: 0
FATIGUE: 1
POLYPHAGIA: 0
JOINT SWELLING: 0
HOARSE VOICE: 0
TINGLING: 0
HEMATURIA: 0
BACK PAIN: 1
LOSS OF CONSCIOUSNESS: 0
NECK MASS: 0
FLANK PAIN: 1
TROUBLE SWALLOWING: 0
MEMORY LOSS: 0
WEAKNESS: 1
ALTERED TEMPERATURE REGULATION: 0
DISTURBANCES IN COORDINATION: 0
MUSCLE WEAKNESS: 1
MUSCLE CRAMPS: 0
DECREASED APPETITE: 0
SMELL DISTURBANCE: 0
NIGHT SWEATS: 0
DYSURIA: 0
INCREASED ENERGY: 0
WEIGHT LOSS: 0
TREMORS: 0

## 2018-03-20 ENCOUNTER — OFFICE VISIT (OUTPATIENT)
Dept: ORTHOPEDICS | Facility: CLINIC | Age: 73
End: 2018-03-20
Payer: COMMERCIAL

## 2018-03-20 VITALS — HEIGHT: 68 IN | WEIGHT: 275 LBS | RESPIRATION RATE: 16 BRPM | BODY MASS INDEX: 41.68 KG/M2

## 2018-03-20 DIAGNOSIS — M47.816 FACET ARTHROPATHY, LUMBAR: Primary | ICD-10-CM

## 2018-03-20 DIAGNOSIS — Z98.1 H/O SPINAL FUSION: ICD-10-CM

## 2018-03-20 NOTE — LETTER
3/20/2018       RE: Gabriel Hunter  [address]  [place] [address]     Dear Colleague,    Thank you for referring your patient, Gabriel Hunter, to the [facility] ORTHOPAEDIC CLINIC at [facility]. Please see a copy of my visit note below.    REASON FOR VISIT: Recheck low back pain    REFERRING PHYSICIAN: Referred Self     PRIMARY CARE PHYSICIAN: Henrietta Avendano A    HISTORY OF PRESENT ILLNESS: Gabriel Hunter is a 72 year old male who returns to clinic today for further follow-up after a neurology appointment earlier this month for balance problems.  He reports that Dr. Jamil ordered several blood tests, an MRI of the brain, and physical therapy for a vestibular/balance program.  He will follow-up in neurology again next month.  He states that if he could accomplish one thing at today's visit, he would like to address his low back pain.  He reports belt line low back pain bilaterally.  He denies pain in the midline.  He occasionally has some cramping in the left buttocks, but he denies pain that radiates into the legs.  His symptoms are worse with walking and when standing in long lines.     Oswestry score: 40% (previously 42%)  Hvdzzz26: 21 (previously 22)  Pain scale: 3 (previously 4)    PHYSICAL EXAM:   Constitutional - Patient is healthy, well-nourished and appears stated age.    BMI = 41.81    Respiratory - Patient is breathing normally and in no respiratory distress.   Skin - No suspicious rashes or lesions.   Psychiatric - Normal mood and affect.   Cardiovascular - Extremities are warm and well perfused.   Eyes - Visual acuity is normal to the written word.   ENT - Hearing intact to the spoken word.   GI - No abdominal distention.   Musculoskeletal - Antalgic gait with the use of a cane.  Raises from a chair using his arms to push off.  Tenderness to palpation over the quadratus lumborum bilaterally.      IMAGING: No new imaging today.  MRI of the thoracic  spine, dated 2/20/18, was reviewed today.  It shows multilevel degenerative changes with some degree of auto-fusion.  There is a hemangioma at T12, which remains stable when compared to MRI from 2014.    MRI of the lumbar spine, dated 2/20/18, was also reviewed today.  It shows facet arthropathy at L4-5 and L5-S1.    CLINICAL ASSESSMENT:   1. History of lumbar spinal fusion  2. Bilateral quadratus lumborum muscle spasm  3. Lumbar facet arthropathy, L4-5 & L5-S1      DISCUSSION/PLAN:   Gabriel is a 72 year old male who returned to clinic today for further evaluation of loss of balance, as well as low back pain.  He was instructed to see neurology regarding the lack of coordination.  They ordered multiple blood tests, a brain MRI, and physical therapy for a vestibular/balance program.  He will follow-up with Dr. Jamil again next month for results.  Fortunately, these symptoms are not related to cord compression/myelopathy.  With regards to his low back pain, we recommend a referral to a pain clinic for evaluation of facet arthropathy.  He may be a candidate for medial branch blocks/facet radiofrequency ablation.  A surgical solution would include an extension of his fusion down to the pelvis.  This would be a major surgery, taking about 6-7 hours to complete, and he would need to be in the hospital for 3-5 days.  It would not solve his balance problems, and the risk/benefit ratio may not be worthwhile at this time.  He would like to hold off on surgery for as long as possible, so he is willing to try the pain clinic.  He can follow-up as needed.    All questions and concerns were answered to the patient's apparent satisfaction before leaving the clinic.     Thank you for allowing Dr. Urbina and I to participate in the care of Gabriel Hunter.    Respectfully,  Radha Gonzalez PA-C    CC  Copy to patient    [address] [place] [place] [address]      Attestation:  I have personally evaluated and carried out  discussion with the patient, and agree with the findings and plan outlined in the note.      Again, thank you for allowing me to participate in the care of your patient.      Sincerely,    Chepe Urbina MD
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
## 2018-03-20 NOTE — PROGRESS NOTES
REASON FOR VISIT: Recheck low back pain    REFERRING PHYSICIAN: Referred Self     PRIMARY CARE PHYSICIAN: Henrietta Avendano A    HISTORY OF PRESENT ILLNESS: Gabriel Hunter is a 72 year old male who returns to clinic today for further follow-up after a neurology appointment earlier this month for balance problems.  He reports that Dr. Jamil ordered several blood tests, an MRI of the brain, and physical therapy for a vestibular/balance program.  He will follow-up in neurology again next month.  He states that if he could accomplish one thing at today's visit, he would like to address his low back pain.  He reports belt line low back pain bilaterally.  He denies pain in the midline.  He occasionally has some cramping in the left buttocks, but he denies pain that radiates into the legs.  His symptoms are worse with walking and when standing in long lines.     Oswestry score: 40% (previously 42%)  Wyndsp71: 21 (previously 22)  Pain scale: 3 (previously 4)    PHYSICAL EXAM:   Constitutional - Patient is healthy, well-nourished and appears stated age.    BMI = 41.81    Respiratory - Patient is breathing normally and in no respiratory distress.   Skin - No suspicious rashes or lesions.   Psychiatric - Normal mood and affect.   Cardiovascular - Extremities are warm and well perfused.   Eyes - Visual acuity is normal to the written word.   ENT - Hearing intact to the spoken word.   GI - No abdominal distention.   Musculoskeletal - Antalgic gait with the use of a cane.  Raises from a chair using his arms to push off.  Tenderness to palpation over the quadratus lumborum bilaterally.      IMAGING: No new imaging today.  MRI of the thoracic spine, dated 2/20/18, was reviewed today.  It shows multilevel degenerative changes with some degree of auto-fusion.  There is a hemangioma at T12, which remains stable when compared to MRI from 2014.    MRI of the lumbar spine, dated 2/20/18, was also reviewed today.  It shows facet  arthropathy at L4-5 and L5-S1.    CLINICAL ASSESSMENT:   1. History of lumbar spinal fusion  2. Bilateral quadratus lumborum muscle spasm  3. Lumbar facet arthropathy, L4-5 & L5-S1      DISCUSSION/PLAN:   Gabriel is a 72 year old male who returned to clinic today for further evaluation of loss of balance, as well as low back pain.  He was instructed to see neurology regarding the lack of coordination.  They ordered multiple blood tests, a brain MRI, and physical therapy for a vestibular/balance program.  He will follow-up with Dr. Jamil again next month for results.  Fortunately, these symptoms are not related to cord compression/myelopathy.  With regards to his low back pain, we recommend a referral to a pain clinic for evaluation of facet arthropathy.  He may be a candidate for medial branch blocks/facet radiofrequency ablation.  A surgical solution would include an extension of his fusion down to the pelvis.  This would be a major surgery, taking about 6-7 hours to complete, and he would need to be in the hospital for 3-5 days.  It would not solve his balance problems, and the risk/benefit ratio may not be worthwhile at this time.  He would like to hold off on surgery for as long as possible, so he is willing to try the pain clinic.  He can follow-up as needed.    All questions and concerns were answered to the patient's apparent satisfaction before leaving the clinic.     Thank you for allowing Dr. Urbina and I to participate in the care of Gabriel Hunter.    Respectfully,  Radha Gonzalez PA-C    CC  Copy to patient    [id number] [place] [address]      Attestation:  I have personally evaluated and carried out discussion with the patient, and agree with the findings and plan outlined in the note.
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
## 2018-03-20 NOTE — DISCHARGE INSTRUCTIONS
MRI Contrast Discharge Instructions    The IV contrast you received today will pass out of your body in your  urine. This will happen in the next 24 hours. You will not feel this process.  Your urine will not change color.    Drink at least 4 extra glasses of water or juice today (unless your doctor  has restricted your fluids). This reduces the stress on your kidneys.  You may take your regular medicines.    If you are on dialysis: It is best to have dialysis today.    If you have a reaction: Most reactions happen right away. If you have  any new symptoms after leaving the hospital (such as hives or swelling),  call your hospital at the correct number below. Or call your family doctor.  If you have breathing distress or wheezing, call 911.    Special instructions: ***    I have read and understand the above information.    Signature:______________________________________ Date:___________    Staff:__________________________________________ Date:___________     Time:__________    [facility] Departments:    ___[place]: [phone]  ___[facility]: [phone]  ___[place]: [phone] ___[facility]: [phone]  ___[facility]: [phone]  ___[facility]: [phone]  ___[phone]  ___[facility]: [phone]  ___[phone]

## 2018-03-20 NOTE — MR AVS SNAPSHOT
After Visit Summary   3/20/2018    Gabriel Hunter    MRN: [id number]           Patient Information     Date Of Birth          1945        Visit Information        Provider Department      3/20/2018 10:45 AM Chepe Urbina MD [facility]        Today's Diagnoses     Facet arthropathy, lumbar    -  1    H/O spinal fusion           Follow-ups after your visit        Additional Services     PAIN MANAGEMENT REFERRAL       [facility]    **ANY DIAGNOSTIC TESTS THAT ARE NOT IN EPIC SHOULD BE SENT TO THE PAIN CENTER**    REGARDING OPIOID MEDICATIONS:  The discussion of opioids management, appropriateness of therapy, and dosing will be discussed in patients being seen for evaluation.  The pain management clinics are not long-term prescribing clinics, with transition of prescribing of medications ultimately going back to the referring provider/PCP.  If prescribing is taken over at the pain clinic, it is in actively involved patients whom are appropriate for opioids, urine drug screening is completed, and long-term prescribing plan has been determined.  Therefore, we will not be automatically taking over prescribing at the patient's first visit.  Is this agreeable to you? agrees.     Please be aware that coverage of these services is subject to the terms and limitations of your health insurance plan.  Call member services at your health plan with any benefit or coverage questions.      Please bring the following with you to your appointment:    (1) Any X-Rays, CTs or MRIs which have been performed.  Contact the facility where they were done to arrange for  prior to your scheduled appointment.    (2) List of current medications   (3) This referral request   (4) Any documents/labs given to you for this referral                  Follow-up notes from your care team     Return if symptoms worsen or fail to improve.      Your next 10  "appointments already scheduled     Apr 04, 2018  8:45 AM CDT   Neuro Eval with Shanae Narayan, PT   [facility] ([facility])    [address] [facility] [address] [phone]            Apr 17, 2018 10:00 AM CDT   (Arrive by 9:45 AM)   Return Visit with Vazquez Jamil MD   [facility] ([facility])    [address] [facility] [address] [phone]              Who to contact     Please call your clinic at [phone] to:    Ask questions about your health    Make or cancel appointments    Discuss your medicines    Learn about your test results    Speak to your doctor            Additional Information About Your Visit        [employer] Information     [employer] gives you secure access to your electronic health record. If you see a primary care provider, you can also send messages to your care team and make appointments. If you have questions, please call your primary care clinic.  If you do not have a primary care provider, please call [phone] and they will assist you.      [employer] is an electronic gateway that provides easy, online access to your medical records. With [employer], you can request a clinic appointment, read your test results, renew a prescription or communicate with your care team.     To access your existing account, please contact your [facility] or call [phone] for assistance.        Care EveryWhere ID     This is your Care EveryWhere ID. This could be used by other organizations to access your [facility] medical records  [id number]        Your Vitals Were     Respirations Height BMI (Body Mass Index)             16 1.727 m (5' 8\") 41.81 kg/m2          Blood Pressure from Last 3 Encounters:   03/12/18 146/73    Weight from Last 3 Encounters:   03/20/18 124.7 kg (275 lb)   02/08/18 123.8 kg (273 lb)   01/04/18 122.9 kg (271 lb)              We " Performed the Following     PAIN MANAGEMENT REFERRAL        Primary Care Provider Office Phone # Fax #    Henrietta Avendano [phone]       [facility] [address] [facility] [address]        Equal Access to Services     BITA BUTLER: Hadii aad ku hadleonelo Sohollyali, waaxda luqadaha, qaybta kaalmada adeegyada, magdy cope javontong ruvalcaba addie butler. So [facility] [phone].    ATENCIÓN: Si habla español, tiene a francisco disposición servicios gratuitos de asistencia lingüística. Llame al [phone].    We comply with applicable federal civil rights laws and [place] laws. We do not discriminate on the basis of race, color, national origin, age, disability, sex, sexual orientation, or gender identity.            Thank you!     Thank you for choosing [facility]  for your care. Our goal is always to provide you with excellent care. Hearing back from our patients is one way we can continue to improve our services. Please take a few minutes to complete the written survey that you may receive in the mail after your visit with us. Thank you!             Your Updated Medication List - Protect others around you: Learn how to safely use, store and throw away your medicines at www.disposemymeds.org.          This list is accurate as of 3/20/18 11:59 PM.  Always use your most recent med list.                   Brand Name Dispense Instructions for use Diagnosis    amLODIPine 5 MG tablet    NORVASC      Arthrodesis status, Balance problems, Lumbar radiculopathy       pantoprazole 40 MG EC tablet    PROTONIX      Arthrodesis status, Balance problems, Lumbar radiculopathy       simvastatin 40 MG tablet    ZOCOR      Arthrodesis status, Balance problems, Lumbar radiculopathy       trospium 20 MG tablet    SANCTURA      Arthrodesis status, Balance problems, Lumbar radiculopathy       venlafaxine 150 MG 24 hr capsule    EFFEXOR-XR      Arthrodesis status, Balance problems, Lumbar radiculopathy

## 2018-03-27 LAB — PNP ABY SERUM: NORMAL

## 2018-04-04 ENCOUNTER — HOSPITAL ENCOUNTER (OUTPATIENT)
Dept: PHYSICAL THERAPY | Facility: CLINIC | Age: 73
Setting detail: THERAPIES SERIES
End: 2018-04-04
Attending: PSYCHIATRY & NEUROLOGY
Payer: COMMERCIAL

## 2018-04-04 PROCEDURE — 97110 THERAPEUTIC EXERCISES: CPT | Mod: GP | Performed by: PHYSICAL THERAPIST

## 2018-04-04 PROCEDURE — 40000185 ZZHC STATISTIC PT OUTPT VISIT: Performed by: PHYSICAL THERAPIST

## 2018-04-04 PROCEDURE — 97112 NEUROMUSCULAR REEDUCATION: CPT | Mod: GP | Performed by: PHYSICAL THERAPIST

## 2018-04-04 PROCEDURE — 97162 PT EVAL MOD COMPLEX 30 MIN: CPT | Mod: GP | Performed by: PHYSICAL THERAPIST

## 2018-04-04 NOTE — PROGRESS NOTES
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  He reports he started using a cane in 2014 due to this, was falling 2-3x/wk, but more recently has been falling much less, last in October 2017.  He reports his balance is worse in low-light areas, dark, or when eyes are closed.  He needs to sit in the shower due to instability with standing.  He deals with chronic back pain radiating into his hips.  Has numbness B LEs, seen by neurology at [facility], told this could progressively get worse.  Gradual numbness B hands x 5 yrs.  Chronic hearing loss and tinnitus.  Hx of prostate cancer, for which he was treated wtih complete prostatectomy and radiation, resulting in LLE numbness and foot drop.  Hx of previous lumbar fusions.  Also with hx of vertigo 2 yrs ago, [facility].  More recently, was treated with Epley at his doctor's appt, which seemed to help.  Was using an AFO for L footdrop, but has discontinued using it over past 1-2 yrs due to swelling B lower legs and pain from neuropathy when brace touches his legs.   Pertinent Visual History  wears corrective glasses   Prior level of function comment Indep PLOF, uses SEC. Limited mobiltiy/activity tolerance due to LBP and balance impairement, standing/walking limited to ~10 min.  Although,  "can walk with shopping cart for longer durations around grocery store.   Diagnostic Tests EMG;MRI   MRI Results Results   MRI results MRI 02/2018: some ventral cord flattening of the C6-C7 interspace; however, there was no evidence of T2 hyperintensity.  There was severe bilateral foraminal stenosis at C5-C6 and multiple level bilateral foraminal stenosis L3 through C5 and C6-C7.  disk herniation at T12-L1, L4-L5 and L5-S1 without focal disk herniation.  Scheuermann's like changes and he did have moderately severe right foraminal stenosis at T11-T12 and mild to moderate right foraminal stenosis at T4-T5.  He had a 3 mm central herniated disk at T4-T5 without cord compromise.  The patient did have a hemangioma with abnormal T2 signal boarding again at the T12 level.   EMG Results Results   EMG results EMG 12/2017: bilateral S1, L5, L4 and to a lesser extent right L3 radiculopathies versus anterior horn cell disease at the same levels versus lumbosacral plexopathy.  The patient also was felt to have an axonal, sensory-predominant, length dependent polyneuropathy.   Current Community Support Family/friend caregiver  (spouse)   Patient role/Employment history Retired;Disabled   Living environment House[facility]   Home/Community Accessibility Comments split level home, no difficulty reported on stairs as long as he has a railing   Current Assistive Devices Standard Cane   ADL Devices Shower/Tub Chair   Assistive Devices Comments Using cane for several years, shower chair due to instability when standing in shower   Patient/Family Goals Statement Improve gait stability, balance, and ability to walk with improved confidence/safety.   General Information Comments [facility], goes in for reassessment every 2 wks.  Focusing primarily on dietary changes and walking program.   Fall Risk Screen   Fall screen completed by PT   Have you fallen 2 or more times in the past year? Yes   Have you " fallen and had an injury in the past year? Yes   Timed Up and Go score (seconds) 10.71''   Is patient a fall risk? Department fall risk interventions implemented;Yes   Fall screen comments Not at increased risk for falls per TUG score, but given hx falls and functional impairments, clinically appears at increased risk for falls.. Does compensate well with SEC.  8/12 4-item DGI, cutoff value for increased fall risk is 9/12.   Pain   Patient currently in pain Yes   Pain location chronic LBP radiating into B buttocks/hips   Pain rating 2/10   Pain description Pressure;Discomfort   Pain comments Increases throughout the day and with prolonged standing activity.   Cognitive Status Examination   Orientation orientation to person, place and time   Integumentary   Integumentary Comments mild generalized swelling B lower legs, states his AFO he has for his L ankle does not fit well and is painful from neuropathy, has not used it for 1-2 yrs.   Posture   Posture Forward head position;Protracted shoulders   Palpation   Palpation TTP B lower legs   Range of Motion (ROM)   ROM Comment PROM B hips >100 deg, knees ~100 deg flexion.   R ankle WNL, L ankle lacking full end range DF, PROM to ~neutral, able to perform > neutral in WB PROM. WNL BUEs.  Limited hip extension B, limited lumbar extension.   Strength   Strength Comments Full 5/5 strength RLE ankle DF, PF, knee ext, knee flexion.  R hip flexion 4/5, R hip ABD 4-/5.  Focal weakness noted L ankle DF and eversion.  L foot DF 0/5 to trace activation, eversion 2/5, knee ext 5/5, knee flexion 5/5, hip flexion 4/5, hip ABD 3+/5.  Weakness noted B ankle DF with limited ability to perform standing heel raise B.   Bed Mobility   Bed Mobility Comments Indep, no dizziness reported today.   Transfer Skills   Transfer Comments Indep sit <> stand, mild retropulsion noted at times, using UE support for balance, mild FF posture in standing.   Gait   Gait Comments Amb with wide WILLIAM, B  trendelenberg during stance phase L>R, using SEC prn, reciprocal gait pattern, slowed gait speed noted.  Up/down stairs reciprocal pattern with use of railing, limited descent control with L leg due to weakness.   Gait Special Tests   Gait Special Tests 25 FOOT TIMED WALK;DYNAMIC GAIT INDEX   Gait Special Tests 25 Foot Timed Walk   Seconds 9.84   Comments SEC R hand.  Equivalent to ~0.7-0.8 m/sec gait speed   Gait Special Tests Dynamic Gait Index   Comments 8/12 4-item DGI screen, increased risk for falls based on 9/12 cutoff score   Balance   Balance Comments Impaired static standing balance and gait stability. Challenged with EC, NBOS, conforming surfaces.   Balance Special Tests   Balance Special Tests Timed up and go;Modified CTSIB Conditions;Sit to stand reps   Balance Special Tests Timed Up and Go   Seconds 10.71 Seconds   Comments SEC R hand, 9 sec normative for age/sex   Balance Special Tests Modified CTSIB Conditions   Condition 1, seconds 30 Seconds   Condition 2, seconds 8 Seconds   Condition 4, seconds 16 Seconds   Condition 5, seconds 5 Seconds   Balance Special Tests Sit to Stand Reps in 30 Seconds   Reps in 30 seconds 11   Comments no use of hands, poor descent control noted at times.  Normative for age/sex is 8-13 reps.   Sensory Examination   Sensory Perception Comments baseline peripheral neuropathy in hands and legs. Significant loss of sensation B lower legs distal to knees, improves proximally.   Coordination   Coordination no deficits were identified   Muscle Tone   Muscle Tone no deficits were identified   Modality Interventions   Planned Modality Interventions Comments as needed per therapist discretion   Planned Therapy Interventions   Planned Therapy Interventions balance training;gait training;neuromuscular re-education;strengthening;ROM;stretching;transfer training;manual therapy;other (see comments)  (CRM as needed if vertigo recurs)   Clinical Impression   Criteria for Skilled  Therapeutic Interventions Met yes, treatment indicated   PT Diagnosis Impaired gait stability and balance   Influenced by the following impairments Impaired balance, L foot drop, severe neuropathy with poor sensation, swelling LEs, mild FF posture, proximal hip weakness   Functional limitations due to impairments Impaired safety with gait skills on uneven surfaces, slowed gait speed, impaired balance/safety walking/standing in low-light areas, fall risk, limited tolerance with standing/walking activity   Clinical Presentation Evolving/Changing   Clinical Presentation Rationale progressing neuropathy symptoms and pending w/u with [facility]y/[facility] yet, indep PLOF, fall hx, complex PMH   Clinical Decision Making (Complexity) Moderate complexity   Therapy Frequency other (see comments)  (2x/wk x 3 wks, then 1x/wk x 3 more weeks, dec freq as able)   Predicted Duration of Therapy Intervention (days/wks) 8 wks   Risk & Benefits of therapy have been explained Yes   Patient, Family & other staff in agreement with plan of care Yes   Education Assessment   Preferred Learning Style Listening;Reading;Demonstration;Pictures/video   Barriers to Learning No barriers   GOALS   PT Eval Goals 1;2;3;4   Goal 1   Goal Identifier TUG   Goal Description Zachariah will demo the TUG test in <9 sec with SEC to demo improving strength, balance, and gait stability, lower risk for falls (baseline score 10.71'', norms for age/sex < 9 sec).   Target Date 06/08/18   Goal 2   Goal Identifier Gait Speed   Goal Description Zachariah will demo 25' gait in <6.35'' to show gait speed of at least 1.2 m/sec needed for functional ambulation in busier community environments (baseline gait speed 9.84'' = 0.7-0.8 m/sec)   Target Date 06/08/18   Goal 3   Goal Identifier Balance   Goal Description Zachariah will demo ability to perform Rhomberg EC standing balance x 30 sec with no LOB to show improving balance needed for function in low light areas of home.   Target Date 06/08/18    Goal 4   Goal Identifier DGI   Goal Description Zachariah will demo DGI score of 19/24 or greater to show improving gait stability and lower risk for falls.   Target Date 06/08/18   Goal 5   Goal Identifier HEP   Goal Description Zachariah will demo (I) with HEP for continued improvement/management of balance issues and decreased long-term risk for falls, improved daily function.   Target Date 06/08/18   Total Evaluation Time   Total Evaluation Time (Minutes) 34

## 2018-04-11 ENCOUNTER — HOSPITAL ENCOUNTER (OUTPATIENT)
Dept: PHYSICAL THERAPY | Facility: CLINIC | Age: 73
Setting detail: THERAPIES SERIES
End: 2018-04-11
Attending: PSYCHIATRY & NEUROLOGY
Payer: COMMERCIAL

## 2018-04-11 PROCEDURE — 97110 THERAPEUTIC EXERCISES: CPT | Mod: GP | Performed by: PHYSICAL THERAPIST

## 2018-04-11 PROCEDURE — 97112 NEUROMUSCULAR REEDUCATION: CPT | Mod: GP | Performed by: PHYSICAL THERAPIST

## 2018-04-11 PROCEDURE — 40000185 ZZHC STATISTIC PT OUTPT VISIT: Performed by: PHYSICAL THERAPIST

## 2018-04-16 ENCOUNTER — HOSPITAL ENCOUNTER (OUTPATIENT)
Dept: PHYSICAL THERAPY | Facility: CLINIC | Age: 73
Setting detail: THERAPIES SERIES
End: 2018-04-16
Attending: PSYCHIATRY & NEUROLOGY
Payer: COMMERCIAL

## 2018-04-16 PROCEDURE — 40000718 ZZHC STATISTIC PT DEPARTMENT ORTHO VISIT: Performed by: PHYSICAL THERAPIST

## 2018-04-16 PROCEDURE — 97112 NEUROMUSCULAR REEDUCATION: CPT | Mod: GP | Performed by: PHYSICAL THERAPIST

## 2018-04-16 PROCEDURE — 97110 THERAPEUTIC EXERCISES: CPT | Mod: GP | Performed by: PHYSICAL THERAPIST

## 2018-04-17 ENCOUNTER — OFFICE VISIT (OUTPATIENT)
Dept: NEUROLOGY | Facility: CLINIC | Age: 73
End: 2018-04-17
Payer: COMMERCIAL

## 2018-04-17 VITALS — HEIGHT: 68 IN | DIASTOLIC BLOOD PRESSURE: 84 MMHG | SYSTOLIC BLOOD PRESSURE: 147 MMHG | HEART RATE: 84 BPM

## 2018-04-17 DIAGNOSIS — G60.3 IDIOPATHIC PROGRESSIVE POLYNEUROPATHY: ICD-10-CM

## 2018-04-17 DIAGNOSIS — R27.0 ATAXIA: Primary | ICD-10-CM

## 2018-04-17 ASSESSMENT — PAIN SCALES - GENERAL: PAINLEVEL: NO PAIN (0)
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
## 2018-04-17 NOTE — MR AVS SNAPSHOT
After Visit Summary   4/17/2018    Gabriel Hunter    MRN: [id number]           Patient Information     Date Of Birth          1945        Visit Information        Provider Department      4/17/2018 10:00 AM Vazquez Jamil MD [facility] Neurology        Today's Diagnoses     Ataxia    -  1    Idiopathic progressive polyneuropathy           Follow-ups after your visit        Your next 10 appointments already scheduled     Apr 23, 2018  1:45 PM CDT   Ortho Treatment with Isidro Earlham, PT   [facility] ([facility])    [address] [address]   [phone]            May 07, 2018  9:45 AM CDT   Ortho Treatment with Isidro Earlham, PT   [facility] ([facility])    [address] [facility] [address]   [phone]            May 09, 2018  8:45 AM CDT   Ortho Treatment with Isidro Wayne, PT   [facility] ([facility])    [address] [facility] [address]   [phone]            May 16, 2018  8:45 AM CDT   Ortho Treatment with Isidro Earlham, PT   [facility] ([facility])    [address] [facility] [address]   [phone]            May 23, 2018  8:45 AM CDT   Ortho Treatment with Ara Li, PT   [facility] ([facility])    [address] [facility] [address]   [phone]            May 30, 2018  8:45 AM CDT   Ortho Treatment with Isidro Wayne, PT   [facility] ([facility])    [address] [facility] [address]   [phone]              Who to contact     Please call your clinic at [phone] to:    Ask questions about your health    Make or cancel appointments    Discuss your medicines    Learn about your test results    Speak to your doctor            Additional Information  "About Your Visit        [employer] Information     [employer] gives you secure access to your electronic health record. If you see a primary care provider, you can also send messages to your care team and make appointments. If you have questions, please call your primary care clinic.  If you do not have a primary care provider, please call [phone] and they will assist you.      [employer] is an electronic gateway that provides easy, online access to your medical records. With [employer], you can request a clinic appointment, read your test results, renew a prescription or communicate with your care team.     To access your existing account, please contact your [facility] or call [phone] for assistance.        Care EveryWhere ID     This is your Care EveryWhere ID. This could be used by other organizations to access your [place] medical records  [id number]        Your Vitals Were     Pulse Height                84 1.727 m (5' 8\")           Blood Pressure from Last 3 Encounters:   04/17/18 147/84   03/12/18 146/73    Weight from Last 3 Encounters:   03/20/18 124.7 kg (275 lb)   02/08/18 123.8 kg (273 lb)   01/04/18 122.9 kg (271 lb)              Today, you had the following     No orders found for display       Primary Care Provider Office Phone # Fax #    Henrietta Petersennsandrea [phone]       [facility] [address] [facility] [address]        Equal Access to Services     BITA FUENTES : Hadii khadijah ren hadasho Sojanice, waaxda luqadaha, qaybta kaalmada adeegyada, magdy real . So [facility] [phone].    ATENCIÓN: Si habla español, tiene a francisco disposición servicios gratuitos de asistencia lingüística. Llame al [phone].    We comply with applicable federal civil rights laws and [place] laws. We do not discriminate on the basis of race, color, national origin, age, disability, sex, sexual orientation, or gender identity.            Thank " you!     Thank you for choosing [facility]  for your care. Our goal is always to provide you with excellent care. Hearing back from our patients is one way we can continue to improve our services. Please take a few minutes to complete the written survey that you may receive in the mail after your visit with us. Thank you!             Your Updated Medication List - Protect others around you: Learn how to safely use, store and throw away your medicines at www.disposemymeds.org.          This list is accurate as of 4/17/18 11:59 PM.  Always use your most recent med list.                   Brand Name Dispense Instructions for use Diagnosis    amLODIPine 5 MG tablet    NORVASC      Arthrodesis status, Balance problems, Lumbar radiculopathy       pantoprazole 40 MG EC tablet    PROTONIX      Arthrodesis status, Balance problems, Lumbar radiculopathy       simvastatin 40 MG tablet    ZOCOR      Arthrodesis status, Balance problems, Lumbar radiculopathy       trospium 20 MG tablet    SANCTURA      Arthrodesis status, Balance problems, Lumbar radiculopathy       venlafaxine 150 MG 24 hr capsule    EFFEXOR-XR      Arthrodesis status, Balance problems, Lumbar radiculopathy

## 2018-04-18 ENCOUNTER — HOSPITAL ENCOUNTER (OUTPATIENT)
Dept: PHYSICAL THERAPY | Facility: CLINIC | Age: 73
Setting detail: THERAPIES SERIES
End: 2018-04-18
Attending: PSYCHIATRY & NEUROLOGY
Payer: COMMERCIAL

## 2018-04-18 PROCEDURE — 40000185 ZZHC STATISTIC PT OUTPT VISIT: Performed by: PHYSICAL THERAPIST

## 2018-04-18 PROCEDURE — 97110 THERAPEUTIC EXERCISES: CPT | Mod: GP | Performed by: PHYSICAL THERAPIST

## 2018-04-18 PROCEDURE — 97112 NEUROMUSCULAR REEDUCATION: CPT | Mod: GP | Performed by: PHYSICAL THERAPIST
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
## 2018-04-23 ENCOUNTER — HOSPITAL ENCOUNTER (OUTPATIENT)
Dept: PHYSICAL THERAPY | Facility: CLINIC | Age: 73
Setting detail: THERAPIES SERIES
End: 2018-04-23
Attending: PSYCHIATRY & NEUROLOGY
Payer: COMMERCIAL

## 2018-04-23 PROCEDURE — 97110 THERAPEUTIC EXERCISES: CPT | Mod: GP | Performed by: PHYSICAL THERAPIST

## 2018-04-23 PROCEDURE — 40000718 ZZHC STATISTIC PT DEPARTMENT ORTHO VISIT: Performed by: PHYSICAL THERAPIST

## 2018-04-23 PROCEDURE — 97112 NEUROMUSCULAR REEDUCATION: CPT | Mod: GP | Performed by: PHYSICAL THERAPIST

## 2018-05-07 ENCOUNTER — HOSPITAL ENCOUNTER (OUTPATIENT)
Dept: PHYSICAL THERAPY | Facility: CLINIC | Age: 73
Setting detail: THERAPIES SERIES
End: 2018-05-07
Attending: PSYCHIATRY & NEUROLOGY
Payer: COMMERCIAL

## 2018-05-07 PROCEDURE — 40000718 ZZHC STATISTIC PT DEPARTMENT ORTHO VISIT: Performed by: PHYSICAL THERAPIST

## 2018-05-07 PROCEDURE — 97112 NEUROMUSCULAR REEDUCATION: CPT | Mod: GP | Performed by: PHYSICAL THERAPIST

## 2018-05-07 PROCEDURE — 97110 THERAPEUTIC EXERCISES: CPT | Mod: GP | Performed by: PHYSICAL THERAPIST

## 2018-05-16 ENCOUNTER — HOSPITAL ENCOUNTER (OUTPATIENT)
Dept: PHYSICAL THERAPY | Facility: CLINIC | Age: 73
Setting detail: THERAPIES SERIES
End: 2018-05-16
Attending: PSYCHIATRY & NEUROLOGY
Payer: COMMERCIAL

## 2018-05-16 PROCEDURE — 40000718 ZZHC STATISTIC PT DEPARTMENT ORTHO VISIT: Performed by: PHYSICAL THERAPIST

## 2018-05-16 PROCEDURE — 97112 NEUROMUSCULAR REEDUCATION: CPT | Mod: GP | Performed by: PHYSICAL THERAPIST

## 2018-05-16 PROCEDURE — 97110 THERAPEUTIC EXERCISES: CPT | Mod: GP | Performed by: PHYSICAL THERAPIST

## 2018-05-18 ENCOUNTER — MEDICAL CORRESPONDENCE (OUTPATIENT)
Dept: HEALTH INFORMATION MANAGEMENT | Facility: CLINIC | Age: 73
End: 2018-05-18

## 2018-05-23 ENCOUNTER — HOSPITAL ENCOUNTER (OUTPATIENT)
Dept: PHYSICAL THERAPY | Facility: CLINIC | Age: 73
Setting detail: THERAPIES SERIES
End: 2018-05-23
Attending: PSYCHIATRY & NEUROLOGY
Payer: COMMERCIAL

## 2018-05-23 PROCEDURE — 97110 THERAPEUTIC EXERCISES: CPT | Mod: GP | Performed by: PHYSICAL THERAPIST

## 2018-05-23 PROCEDURE — 40000185 ZZHC STATISTIC PT OUTPT VISIT: Performed by: PHYSICAL THERAPIST

## 2018-05-23 PROCEDURE — 97112 NEUROMUSCULAR REEDUCATION: CPT | Mod: GP | Performed by: PHYSICAL THERAPIST

## 2018-05-30 ENCOUNTER — HOSPITAL ENCOUNTER (OUTPATIENT)
Dept: PHYSICAL THERAPY | Facility: CLINIC | Age: 73
Setting detail: THERAPIES SERIES
End: 2018-05-30
Attending: PSYCHIATRY & NEUROLOGY
Payer: COMMERCIAL

## 2018-05-30 PROCEDURE — 97110 THERAPEUTIC EXERCISES: CPT | Mod: GP | Performed by: PHYSICAL THERAPIST

## 2018-05-30 PROCEDURE — 40000718 ZZHC STATISTIC PT DEPARTMENT ORTHO VISIT: Performed by: PHYSICAL THERAPIST

## 2018-05-30 PROCEDURE — 97112 NEUROMUSCULAR REEDUCATION: CPT | Mod: GP | Performed by: PHYSICAL THERAPIST

## 2018-05-30 NOTE — PROGRESS NOTES
Outpatient Physical Therapy Discharge Note     Patient: Gabriel Hunter  : 1945    Beginning/End Dates of Reporting Period:  18 to 2018    Referring Provider: Vazquez Jamil MD    Therapy Diagnosis: Impaired gait stability and balance     Client Self Report: Zachariah reports improving back pain.  Went camping over the weekend and felt improved balance while walking in the dark, did not need to use his cane.  Feels comfortable with HEP, focusing more on his balance exercises instead of strengthening exercises due to back pain.    Objective Measurements:  Objective Measure: TUG  Details: 9.29'' no SEC, improved from 10.71'' at eval    Objective Measure: 25' Gait Speed  Details: 7.78'' with SEC at normal walking speed, 6.25'' with SEC at 'fast' gait speed    Objective Measure: DGI  Details:  indicating fall risk    Objective Measure: Rhomberg Balance  Details: 30'' Eyes Open and Eyes Closed conditions (WNL for age)    Goals:  Goal Identifier TUG   Goal Description Zachariah will demo the TUG test in <9 sec with SEC to demo improving strength, balance, and gait stability, lower risk for falls (baseline score 10.71'', norms for age/sex < 9 sec).   Target Date 18 (goal nearly met, 9.29'' today with SEC)   Date Met      Progress:     Goal Identifier Gait Speed   Goal Description Zachariah will demo 25' gait in <6.35'' to show gait speed of at least 1.2 m/sec needed for functional ambulation in busier community environments (baseline gait speed 9.84'' = 0.7-0.8 m/sec)   Target Date 18 (Goal met, 6.25'' at 'fast' gait speed)   Date Met      Progress:     Goal Identifier Balance   Goal Description Zachariah will demo ability to perform Rhomberg EC standing balance x 30 sec with no LOB to show improving balance needed for function in low light areas of home.   Target Date 18 (Goal met 5/30, 30 sec EO and EC conditions)   Date Met      Progress:     Goal Identifier DGI   Goal Description Zachariah will demo  DGI score of 19/24 or greater to show improving gait stability and lower risk for falls.   Target Date 06/08/18 (Goal not met, 18/24 indicating fall risk, appears baseline)   Date Met      Progress:     Goal Identifier HEP   Goal Description Zachariah will demo (I) with HEP for continued improvement/management of balance issues and decreased long-term risk for falls, improved daily function.   Target Date 06/08/18 (Goal met 5/30)   Date Met      Progress:       Progress Toward Goals:   Progress this reporting period: Zachariah was seen for a total of 9 PT visits during this episode of care to address lumbar radiculopathy, impaired balance/gait, and weakness.  Chronic progressive neuropathy and bilateral ankle weakness (L ankle weakness involving DF and everters primarily, R ankle weakness primarily involving PF), of which minimal change in strength at ankles noted during this episode of care.  Although, he did demonstrate improvement in overall balance, gait speed, and ability to perform functional tasks without losing his balance.  He continues to be challenged on uneven/conforming surfaces, improving balance with eyes closed or in dark conditions.  Continues to use his SEC for balance with gait skills, which is appropriate given deficits and DGI score of 18/24 indicating elevated risk for falls yet.  Zachariah continues to struggle with LBP at times.  He was instructed in a long term HEP.    Plan:  Discharge from therapy.    Discharge: yes    Reason for Discharge: No further expectation of progress.    Equipment Issued: none    Discharge Plan: Patient to continue home program.

## 2019-09-27 ENCOUNTER — HEALTH MAINTENANCE LETTER (OUTPATIENT)
Age: 74
End: 2019-09-27

## 2020-03-15 ENCOUNTER — HEALTH MAINTENANCE LETTER (OUTPATIENT)
Age: 75
End: 2020-03-15

## 2021-01-09 ENCOUNTER — HEALTH MAINTENANCE LETTER (OUTPATIENT)
Age: 76
End: 2021-01-09

## 2021-05-08 ENCOUNTER — HEALTH MAINTENANCE LETTER (OUTPATIENT)
Age: 76
End: 2021-05-08

## 2021-10-23 ENCOUNTER — HEALTH MAINTENANCE LETTER (OUTPATIENT)
Age: 76
End: 2021-10-23

## 2022-06-04 ENCOUNTER — HEALTH MAINTENANCE LETTER (OUTPATIENT)
Age: 77
End: 2022-06-04

## 2022-10-09 ENCOUNTER — HEALTH MAINTENANCE LETTER (OUTPATIENT)
Age: 77
End: 2022-10-09

## 2023-03-16 ENCOUNTER — TRANSFERRED RECORDS (OUTPATIENT)
Dept: HEALTH INFORMATION MANAGEMENT | Facility: CLINIC | Age: 78
End: 2023-03-16

## 2023-06-01 ENCOUNTER — TRANSFERRED RECORDS (OUTPATIENT)
Dept: HEALTH INFORMATION MANAGEMENT | Facility: CLINIC | Age: 78
End: 2023-06-01

## 2023-06-10 ENCOUNTER — HEALTH MAINTENANCE LETTER (OUTPATIENT)
Age: 78
End: 2023-06-10

## 2024-04-12 ENCOUNTER — TRANSFERRED RECORDS (OUTPATIENT)
Dept: HEALTH INFORMATION MANAGEMENT | Facility: CLINIC | Age: 79
End: 2024-04-12

## 2024-06-18 ENCOUNTER — TRANSFERRED RECORDS (OUTPATIENT)
Dept: HEALTH INFORMATION MANAGEMENT | Facility: CLINIC | Age: 79
End: 2024-06-18
Payer: MEDICARE

## 2024-06-19 DIAGNOSIS — S32.010A COMPRESSION FRACTURE OF L1 LUMBAR VERTEBRA, CLOSED, INITIAL ENCOUNTER (H): Primary | ICD-10-CM

## 2024-06-24 ENCOUNTER — TELEPHONE (OUTPATIENT)
Dept: ORTHOPEDICS | Facility: CLINIC | Age: 79
End: 2024-06-24
Payer: MEDICARE

## 2024-06-24 ENCOUNTER — TRANSFERRED RECORDS (OUTPATIENT)
Dept: HEALTH INFORMATION MANAGEMENT | Facility: CLINIC | Age: 79
End: 2024-06-24
Payer: MEDICARE

## 2024-06-24 NOTE — TELEPHONE ENCOUNTER
[facility] Call Center    Phone Message    May a detailed message be left on voicemail: yes     Reason for Call: Other: FYI - message is for Shanae - [facility] will [facility] for [facility] care. Patient will bring disc with. No call back necessary.     Action Taken: Message routed to:  [facility] Orthopedics    Travel Screening: Not Applicable     Date of Service:

## 2024-06-25 ENCOUNTER — TRANSCRIBE ORDERS (OUTPATIENT)
Dept: OTHER | Age: 79
End: 2024-06-25

## 2024-06-25 DIAGNOSIS — M54.59 OTHER LOW BACK PAIN: Primary | ICD-10-CM

## 2024-06-25 NOTE — TELEPHONE ENCOUNTER
See phone message from pt that [facility] contacted [facility] & plans to keep appt this week 6-27-24 for consult.  Just DAVID. Shanae Carlin RN.

## 2024-06-26 NOTE — TELEPHONE ENCOUNTER
DIAGNOSIS:   Other low back pain [M54.59]  - Primary   APPOINTMENT DATE: 06/27/2024   NOTES STATUS DETAILS   OFFICE NOTE from referring provider MEDIA TAB 04/24/2024 - HERIBERTO MATUTE MD - [facility] [place] [facility]   OFFICE NOTE from other specialist Internal Last Seen:   03/20/2018 - SACHIN URBINA MD - [facility] ORTHO   OPERATIVE REPORT Media Tab 07/23/1999 - Posterior lumbar fusion, segmental fixation, L1-2,L3-4 implantation of the WILMAN bone growth stimulator, right iliac crest bone graft harvest. Segmental fixation using facet screws on the right. Lateral recess decompression L4-5, bilaterally.   (Dr. Dariusz Hill @ [facility])     02/25/2013 - LLIF L2-3   ([facility])   MRI PACS INTERNAL  RAYUS   CT SCAN PACS INTERNAL    [facility]:  06/18/2024 - ABDOMEN/PELVIS  04/28/2023 - CHEST   XRAYS (IMAGES & REPORTS) PACS INTERNAL  RAYUS

## 2024-06-27 ENCOUNTER — ANCILLARY PROCEDURE (OUTPATIENT)
Dept: GENERAL RADIOLOGY | Facility: CLINIC | Age: 79
End: 2024-06-27
Attending: ORTHOPAEDIC SURGERY
Payer: COMMERCIAL

## 2024-06-27 ENCOUNTER — OFFICE VISIT (OUTPATIENT)
Dept: ORTHOPEDICS | Facility: CLINIC | Age: 79
End: 2024-06-27
Payer: COMMERCIAL

## 2024-06-27 ENCOUNTER — PRE VISIT (OUTPATIENT)
Dept: ORTHOPEDICS | Facility: CLINIC | Age: 79
End: 2024-06-27

## 2024-06-27 DIAGNOSIS — S32.010A COMPRESSION FRACTURE OF L1 LUMBAR VERTEBRA, CLOSED, INITIAL ENCOUNTER (H): ICD-10-CM

## 2024-06-27 DIAGNOSIS — S32.018A OTHER CLOSED FRACTURE OF FIRST LUMBAR VERTEBRA, INITIAL ENCOUNTER (H): Primary | ICD-10-CM

## 2024-06-27 PROCEDURE — 72082 X-RAY EXAM ENTIRE SPI 2/3 VW: CPT | Performed by: STUDENT IN AN ORGANIZED HEALTH CARE EDUCATION/TRAINING PROGRAM

## 2024-06-27 PROCEDURE — 77073 BONE LENGTH STUDIES: CPT | Performed by: STUDENT IN AN ORGANIZED HEALTH CARE EDUCATION/TRAINING PROGRAM

## 2024-06-27 PROCEDURE — 99203 OFFICE O/P NEW LOW 30 MIN: CPT | Performed by: ORTHOPAEDIC SURGERY

## 2024-06-27 RX ORDER — LOSARTAN POTASSIUM 100 MG/1
1 TABLET ORAL DAILY
COMMUNITY
Start: 2024-06-20

## 2024-06-27 RX ORDER — ALBUTEROL SULFATE 90 UG/1
2 AEROSOL, METERED RESPIRATORY (INHALATION) EVERY 4 HOURS PRN
COMMUNITY
Start: 2024-01-22

## 2024-06-27 RX ORDER — CARBOXYMETHYLCELLULOSE SODIUM 5 MG/ML
1 SOLUTION/ DROPS OPHTHALMIC 3 TIMES DAILY
COMMUNITY
Start: 2024-06-13

## 2024-06-27 RX ORDER — ARMODAFINIL 250 MG/1
250 TABLET ORAL EVERY MORNING
COMMUNITY
Start: 2024-03-21

## 2024-06-27 RX ORDER — ONDANSETRON 4 MG/1
4 TABLET, ORALLY DISINTEGRATING ORAL EVERY 6 HOURS PRN
COMMUNITY
Start: 2024-06-18

## 2024-06-27 RX ORDER — CHLORTHALIDONE 25 MG/1
25 TABLET ORAL DAILY
COMMUNITY
Start: 2024-06-20

## 2024-06-27 RX ORDER — METHOCARBAMOL 500 MG/1
1 TABLET, FILM COATED ORAL 2 TIMES DAILY PRN
COMMUNITY
Start: 2024-06-20

## 2024-06-27 RX ORDER — PREGABALIN 25 MG/1
25 CAPSULE ORAL 2 TIMES DAILY
COMMUNITY
Start: 2024-06-14

## 2024-06-27 RX ORDER — LIDOCAINE 50 MG/G
PATCH TOPICAL EVERY 24 HOURS
COMMUNITY
Start: 2024-06-18

## 2024-06-27 NOTE — PROGRESS NOTES
Spine Surg Hx:   ~1999 - AP fusion L1-2 and L3-4, with unilateral translaminar facet screw fixation; implantation of bone stim (Dr. Dariusz Hill, [facility]).  02/25/2013 - Part 1: LLIF (R approach) L2-3; Right ant ICBG harvest.  Part 2: Bilateral PPS L2-3 (Carlitos, [facility]).  [Implants: NuVasive XLIF system, CoRoent 22mm wide PEEK cage; NuVasive Precept perc screw system, 5.5 mm titanium rods x2].      In-Person Visit    REASON FOR CONSULTATION: No chief complaint on file.     REFERRING PHYSICIAN: Chepe Urbina  PCP:Henrietta Avendano      History of Present Illness:  78 year old male, whom I last saw 3/20/2018 (> 6 yrs ago) and who had surgery done by me at [facility] in 2013; thus, this is considered a new patient visit.  At that time, was seen for balance issues.  Recommended follow-up with Neurology (Dr. Jamil); referral to Pain Clinic for nonop mgmt of facet arthropathy; discussed poss surgical option (fusion down to pelvis).  Seen today referred by [facility] for 'spinal fracture'.    Patient was seen and evaluated this morning.  He reportedly had a fall on 6/7/2024 in which he felt a pop in his low back.  The patient reports that he was walking down a ramp of a utility trailer when he lost his balance and fell onto his left side.  The patient reportedly felt a pop and had immediate pain in his back following the fall.  This pain has progressively improved however he has had a few episodes in which he has demonstrated an exacerbation of his symptoms.  Such episodes are associated with twisting motions.  He is using a cane for ambulation which is normal.    Back 100% %, Leg 0%,  Left Only  Worse: With twisting  Better: Rest    Previous treatment:   None    Previous Injections:  Not applicable    Oswestry (VASQUEZ) Questionnaire        6/22/2024    11:24 PM   OSWESTRY DISABILITY INDEX   Count 10   Sum 39   Oswestry Score (%) 78 %      VASQUEZ 11/7/17 35.56%  VASQUEZ 6/22/24 78%      PROMIS-10 Scores  Global  Mental Health Score: (P) 7  Global Physical Health Score: (P) 7  PROMIS TOTAL - SUBSCORES: (P) 14    ROS:  A 12-point review of systems was completed and is negative except for otherwise noted above in the history of present illness.    Med Hx:  Past Medical History:   Diagnosis Date    Cancer (H) 2014    Head injury        Surg Hx:  Past Surgical History:   Procedure Laterality Date    ABDOMEN SURGERY  1999.    2014    APPENDECTOMY  [place]    BACK SURGERY  2000---2009    lower spine 3 fused vertabrae    COLONOSCOPY      EYE SURGERY  Cataract    GENITOURINARY SURGERY  2014    Prostate cancer    ORTHOPEDIC SURGERY  R knee replac ement       Allergies:  Allergies   Allergen Reactions    Penicillins Itching and Anaphylaxis    Sildenafil Muscle Pain (Myalgia)    Tadalafil Muscle Pain (Myalgia)       Meds:  Current Outpatient Medications   Medication Sig Dispense Refill    amLODIPine (NORVASC) 5 MG tablet       pantoprazole (PROTONIX) 40 MG EC tablet       simvastatin (ZOCOR) 40 MG tablet       trospium (SANCTURA) 20 MG tablet       venlafaxine (EFFEXOR-XR) 150 MG 24 hr capsule        No current facility-administered medications for this visit.       Fam Hx:  Family History   Problem Relation Age of Onset    Cancer Mother        P/S Hx:  Social History     Tobacco Use    Smoking status: Never    Smokeless tobacco: Never   Substance Use Topics    Alcohol use: No         Physical Exam:  Very pleasant, healthy appearing, alert, oriented x 3, cooperative.  Normal mood and affect.  Not in cardiorespiratory distress.  There were no vitals taken for this visit.  Normal upright posture.    Normal gait without assistive device.  No antalgia / imbalance.    No gross spinal deformity, no skin lesions or surgical scars.  Localizes pain at thoracolumbar and lumbosacral junctions  Tenderness: (+) midline at the thoracolumbar and lumbosacral junctions, (-) paraspinal, (-) R and L PSIS.  ROM:   Guarded    Neuro Exam:  Motor:  Neurologically the patient is intact in his bilateral lower extremities  Sensory: Bilateral lower extremity neuropathy    Lower Extremity:  Equal leg lengths, full pulses, (-) atrophy / asymmetry.  Full painless passive knee and ankle motion.  Straight leg raise: (-) right, (-) left.  Hip impingement: (-) right, (-) left.  IRVIN/Isidro's: (-) right, (-) left.      Imaging:    EOS standing AP and lateral films: There is no obvious radiographic evidence of instability at the L1 fracture.  The fracture is poorly visualized on the EOS standing AP and lateral films.  This is my personal interpretation      [facility]: My personal interpretation, there is a minimally displaced anterior superior corner fracture of L1.  There is no posterior splaying or other fracture identified.        Assessment:    L1 fracture, minimally displaced, stable    78-year-old male who presented to orthopedic spine clinic for evaluation of the above mentioned injury.  We had a long detailed discussion regarding the natural history of spinal fractures.  We explained to him that we feel that this fracture is stable at this time.  At this time we would like the patient to begin bracing.  He should attempt some physical therapy to rehab the back.  We do not feel that the patient meets surgical indications at this time.  The patient can follow-up in 6 weeks for repeat clinical and radiographic evaluation    Plan:    Yolande brace  PT  Follow-up 6 weeks for repeat clinical and radiographic evaluation    Attestation:  I (Dr. Chepe Urbina - Spine Surgeon) have personally evaluated patient with Spine Fellow Dr. Mauro Sky, and agree with findings and plan outlined in the note, which I also edited.  I discussed at length with the patient/family, explained the nature of spinal condition, and formulated workup and/or treatment plan together.  All questions were answered to the best of my ability and to  patient's apparent satisfaction.     30 minutes spent on the date of the encounter doing chart review/review of outside records/review of test results/interpretation of tests/patient visit/documentation/discussion with other provider(s)/discussion with patient and family.    Chepe Urbina MD    Orthopaedic Spine Surgery  Dept Orthopaedic Surgery, [facility]  [phone] office, [phone] pager  [email or web address]

## 2024-06-27 NOTE — LETTER
6/27/2024      Gabriel Hunter  [address] [place] [address]      Dear Colleague,    Thank you for referring your patient, Gabriel Hunter, to the [facility] [place]. Please see a copy of my visit note below.    Spine Surg Hx:   ~1999 - AP fusion L1-2 and L3-4, with unilateral translaminar facet screw fixation; implantation of bone stim (Dr. Dariusz Hill, [facility]).  02/25/2013 - Part 1: LLIF (R approach) L2-3; Right ant ICBG harvest.  Part 2: Bilateral PPS L2-3 (Carlitos, [facility]).  [Implants: NuVasive XLIF system, CoRoent 22mm wide PEEK cage; NuVasive Precept perc screw system, 5.5 mm titanium rods x2].      In-Person Visit    REASON FOR CONSULTATION: No chief complaint on file.     REFERRING PHYSICIAN: Chepe Urbina  PCP:Henrietta Avendano A      History of Present Illness:  78 year old male, whom I last saw 3/20/2018 (> 6 yrs ago) and who had surgery done by me at [facility] in 2013; thus, this is considered a new patient visit.  At that time, was seen for balance issues.  Recommended follow-up with Neurology (Dr. Jamil); referral to Pain Clinic for nonop mgmt of facet arthropathy; discussed poss surgical option (fusion down to pelvis).  Seen today referred by [facility] for 'spinal fracture'.    Patient was seen and evaluated this morning.  He reportedly had a fall on 6/7/2024 in which he felt a pop in his low back.  The patient reports that he was walking down a ramp of a utility trailer when he lost his balance and fell onto his left side.  The patient reportedly felt a pop and had immediate pain in his back following the fall.  This pain has progressively improved however he has had a few episodes in which he has demonstrated an exacerbation of his symptoms.  Such episodes are associated with twisting motions.  He is using a cane for ambulation which is normal.    Back 100% %, Leg 0%,  Left Only  Worse: With twisting  Better: Rest    Previous treatment:    None    Previous Injections:  Not applicable    Oswestry (VASQUEZ) Questionnaire        6/22/2024    11:24 PM   OSWESTRY DISABILITY INDEX   Count 10   Sum 39   Oswestry Score (%) 78 %      VASQUEZ 11/7/17 35.56%  VASQUEZ 6/22/24 78%      PROMIS-10 Scores  Global Mental Health Score: (P) 7  Global Physical Health Score: (P) 7  PROMIS TOTAL - SUBSCORES: (P) 14    ROS:  A 12-point review of systems was completed and is negative except for otherwise noted above in the history of present illness.    Med Hx:  Past Medical History:   Diagnosis Date    Cancer (H) 2014    Head injury        Surg Hx:  Past Surgical History:   Procedure Laterality Date    ABDOMEN SURGERY  1999.    2014    APPENDECTOMY  [place]    BACK SURGERY  2000---2009    lower spine 3 fused vertabrae    COLONOSCOPY      EYE SURGERY  Cataract    GENITOURINARY SURGERY  2014    Prostate cancer    ORTHOPEDIC SURGERY  R knee replac ement       Allergies:  Allergies   Allergen Reactions    Penicillins Itching and Anaphylaxis    Sildenafil Muscle Pain (Myalgia)    Tadalafil Muscle Pain (Myalgia)       Meds:  Current Outpatient Medications   Medication Sig Dispense Refill    amLODIPine (NORVASC) 5 MG tablet       pantoprazole (PROTONIX) 40 MG EC tablet       simvastatin (ZOCOR) 40 MG tablet       trospium (SANCTURA) 20 MG tablet       venlafaxine (EFFEXOR-XR) 150 MG 24 hr capsule        No current facility-administered medications for this visit.       Fam Hx:  Family History   Problem Relation Age of Onset    Cancer Mother        P/S Hx:  Social History     Tobacco Use    Smoking status: Never    Smokeless tobacco: Never   Substance Use Topics    Alcohol use: No         Physical Exam:  Very pleasant, healthy appearing, alert, oriented x 3, cooperative.  Normal mood and affect.  Not in cardiorespiratory distress.  There were no vitals taken for this visit.  Normal upright posture.    Normal gait without assistive device.  No antalgia / imbalance.    No gross spinal  deformity, no skin lesions or surgical scars.  Localizes pain at thoracolumbar and lumbosacral junctions  Tenderness: (+) midline at the thoracolumbar and lumbosacral junctions, (-) paraspinal, (-) R and L PSIS.  ROM:   Guarded    Neuro Exam:  Motor: Neurologically the patient is intact in his bilateral lower extremities  Sensory: Bilateral lower extremity neuropathy    Lower Extremity:  Equal leg lengths, full pulses, (-) atrophy / asymmetry.  Full painless passive knee and ankle motion.  Straight leg raise: (-) right, (-) left.  Hip impingement: (-) right, (-) left.  IRVIN/Isidro's: (-) right, (-) left.      Imaging:    EOS standing AP and lateral films: There is no obvious radiographic evidence of instability at the L1 fracture.  The fracture is poorly visualized on the EOS standing AP and lateral films.  This is my personal interpretation      [facility]: My personal interpretation, there is a minimally displaced anterior superior corner fracture of L1.  There is no posterior splaying or other fracture identified.        Assessment:    L1 fracture, minimally displaced, stable    78-year-old male who presented to orthopedic spine clinic for evaluation of the above mentioned injury.  We had a long detailed discussion regarding the natural history of spinal fractures.  We explained to him that we feel that this fracture is stable at this time.  At this time we would like the patient to begin bracing.  He should attempt some physical therapy to rehab the back.  We do not feel that the patient meets surgical indications at this time.  The patient can follow-up in 6 weeks for repeat clinical and radiographic evaluation    Plan:    Yolande brace  PT  Follow-up 6 weeks for repeat clinical and radiographic evaluation    Attestation:  I (Dr. Chepe Urbina - Spine Surgeon) have personally evaluated patient with Spine Fellow Dr. Mauro Sky, and agree with findings and plan  outlined in the note, which I also edited.  I discussed at length with the patient/family, explained the nature of spinal condition, and formulated workup and/or treatment plan together.  All questions were answered to the best of my ability and to patient's apparent satisfaction.     30 minutes spent on the date of the encounter doing chart review/review of outside records/review of test results/interpretation of tests/patient visit/documentation/discussion with other provider(s)/discussion with patient and family.    Chepe Urbina MD    Orthopaedic Spine Surgery  Dept Orthopaedic Surgery, [facility]  [phone] office, [phone] pager  [email or web address]

## 2024-07-24 DIAGNOSIS — S32.018A OTHER CLOSED FRACTURE OF FIRST LUMBAR VERTEBRA, INITIAL ENCOUNTER (H): Primary | ICD-10-CM

## 2024-08-03 ENCOUNTER — HEALTH MAINTENANCE LETTER (OUTPATIENT)
Age: 79
End: 2024-08-03

## 2024-08-12 NOTE — PROGRESS NOTES
"In person visit:    Spine Surg Hx:   ~ - AP fusion L1-2 and L3-4, with unilateral translaminar facet screw fixation; implantation of bone stim (Dr. Dariusz Hill, [facility]).  2013 - Part 1: LLIF (R approach) L2-3; Right ant ICBG harvest.  Part 2: Bilateral PPS L2-3 (Carlitos [facility]).  [Implants: NuVasive XLIF system, CoRoent 22mm wide PEEK cage; NuVasive Precept perc screw system, 5.5 mm titanium rods x2].    Reason for Visit: follow up fracture  Last Visit: 24  Previous Impression:  L1 fracture, minimally displaced, stable   Previous Plan:  Yolande brace  PT  Follow-up 6 weeks for repeat clinical and radiographic evaluation      S>  Patient is a 79 year old male who returns to clinic today for reevaluation L1 fracture    Accompanied by wife today.  Reports that he is doing much better than previously. Pain improved.  He was using the [place] brace initially to help control pain, but has weaned off of this.  Primary provider prescribed muscle relaxer and NSAID which has helped significantly with pain.  He reports he also has shoulder pain from the fall, [facility] and has been told he needs reverse shoulder replacement.  No other complaints or concerns today.    Oswestry (VASQUEZ) Questionnaire        2024    10:57 AM   OSWESTRY DISABILITY INDEX   Count 9   Sum 15   Oswestry Score (%) 33.33 %      VASQUEZ 17  35.56%  VASQUEZ 24  78%  VASQUEZ 24  33%    O>   Alert, oriented x 3, cooperative.  Not in CP distress.  Ht 1.715 m (5' 7.5\")   Wt 114.3 kg (252 lb)   BMI 38.89 kg/m    Ambulates independently.   Grossly neurologically intact.  Detailed exam not performed today; please see previous note.    Imagin2024 XR Full spine standing AP/lateral view x-rays: Stable appearance of instrumentation L1-4.  Stable coronal and sagittal alignment.  L1 fracture appears stable.    A>  79/M with  L1 fracture, minimally displaced, stable . Improved pain.  History of previous lumbar " fusions, fused L1-4    P>   Reviewed today's x-ray images with patient and his wife which demonstrate stable L1 fracture.  Fortunately, patient's pain has improved with nonoperative treatment.  Recommend continuing to advance activities as tolerated.  He can follow-up on an as-needed basis.    Denisse Devlin (zehra Tanner), NALINI    Attestation:  I (Dr. Chepe Urbina - Spine Surgeon) have personally evaluated patient with NALINI Devlin, and agree with findings and plan outlined in the note, which I also edited.  I discussed at length with the patient/family, explained the nature of spinal condition, and formulated workup and/or treatment plan together.  All questions were answered to the best of my ability and to patient's apparent satisfaction.    Chepe Urbina MD    Orthopaedic Spine Surgery  Dept Orthopaedic Surgery, [facility]  [phone] office, [phone] pager  [email or web address]

## 2024-08-13 ENCOUNTER — OFFICE VISIT (OUTPATIENT)
Dept: ORTHOPEDICS | Facility: CLINIC | Age: 79
End: 2024-08-13
Payer: COMMERCIAL

## 2024-08-13 ENCOUNTER — ANCILLARY PROCEDURE (OUTPATIENT)
Dept: GENERAL RADIOLOGY | Facility: CLINIC | Age: 79
End: 2024-08-13
Attending: ORTHOPAEDIC SURGERY
Payer: COMMERCIAL

## 2024-08-13 VITALS — WEIGHT: 252 LBS | BODY MASS INDEX: 38.19 KG/M2 | HEIGHT: 68 IN

## 2024-08-13 DIAGNOSIS — S32.019D CLOSED FRACTURE OF FIRST LUMBAR VERTEBRA WITH ROUTINE HEALING, UNSPECIFIED FRACTURE MORPHOLOGY, SUBSEQUENT ENCOUNTER: Primary | ICD-10-CM

## 2024-08-13 DIAGNOSIS — S32.018A OTHER CLOSED FRACTURE OF FIRST LUMBAR VERTEBRA, INITIAL ENCOUNTER (H): ICD-10-CM

## 2024-08-13 PROCEDURE — 99213 OFFICE O/P EST LOW 20 MIN: CPT | Performed by: ORTHOPAEDIC SURGERY

## 2024-08-13 PROCEDURE — 72082 X-RAY EXAM ENTIRE SPI 2/3 VW: CPT | Performed by: STUDENT IN AN ORGANIZED HEALTH CARE EDUCATION/TRAINING PROGRAM

## 2024-08-13 NOTE — LETTER
"2024      Gabriel Hunter  [address] [place] [address]      Dear Colleague,    Thank you for referring your patient, Gabriel Hunter, to the [facility] [place]. Please see a copy of my visit note below.    In person visit:    Spine Surg Hx:   ~ - AP fusion L1-2 and L3-4, with unilateral translaminar facet screw fixation; implantation of bone stim (Dr. Dariusz Hill, [facility]).  2013 - Part 1: LLIF (R approach) L2-3; Right ant ICBG harvest.  Part 2: Bilateral PPS L2-3 (Carlitos, [facility]).  [Implants: NuVasive XLIF system, CoRoent 22mm wide PEEK cage; NuVasive Precept perc screw system, 5.5 mm titanium rods x2].    Reason for Visit: follow up fracture  Last Visit: 24  Previous Impression:  L1 fracture, minimally displaced, stable   Previous Plan:  Yolande brace  PT  Follow-up 6 weeks for repeat clinical and radiographic evaluation      S>  Patient is a 79 year old male who returns to clinic today for reevaluation L1 fracture    Accompanied by wife today.  Reports that he is doing much better than previously. Pain improved.  He was using the Yolande brace initially to help control pain, but has weaned off of this.  Primary provider prescribed muscle relaxer and NSAID which has helped significantly with pain.  He reports he also has shoulder pain from the fall, [facility] and has been told he needs reverse shoulder replacement.  No other complaints or concerns today.    Oswestry (VASQUEZ) Questionnaire        2024    10:57 AM   OSWESTRY DISABILITY INDEX   Count 9   Sum 15   Oswestry Score (%) 33.33 %      VASQUEZ 17  35.56%  VASQUEZ 24  78%  VASQUEZ 24  33%    O>   Alert, oriented x 3, cooperative.  Not in CP distress.  Ht 1.715 m (5' 7.5\")   Wt 114.3 kg (252 lb)   BMI 38.89 kg/m    Ambulates independently.   Grossly neurologically intact.  Detailed exam not performed today; please see previous note.    Imagin2024 XR Full spine " standing AP/lateral view x-rays: Stable appearance of instrumentation L1-4.  Stable coronal and sagittal alignment.  L1 fracture appears stable.    A>  79/M with  L1 fracture, minimally displaced, stable . Improved pain.  History of previous lumbar fusions, fused L1-4    P>   Reviewed today's x-ray images with patient and his wife which demonstrate stable L1 fracture.  Fortunately, patient's pain has improved with nonoperative treatment.  Recommend continuing to advance activities as tolerated.  He can follow-up on an as-needed basis.    Denisse Devlin (zehra Tanner), NALINI    Attestation:  I (Dr. Chepe Urbina - Spine Surgeon) have personally evaluated patient with NALINI Devlin, and agree with findings and plan outlined in the note, which I also edited.  I discussed at length with the patient/family, explained the nature of spinal condition, and formulated workup and/or treatment plan together.  All questions were answered to the best of my ability and to patient's apparent satisfaction.    Chepe Urbina MD    Orthopaedic Spine Surgery  Dept Orthopaedic Surgery, [facility]  [phone] office, [phone] pager  [email or web address]           Again, thank you for allowing me to participate in the care of your patient.        Sincerely,        Chepe Urbina MD

## 2024-12-09 ENCOUNTER — TRANSFERRED RECORDS (OUTPATIENT)
Dept: HEALTH INFORMATION MANAGEMENT | Facility: CLINIC | Age: 79
End: 2024-12-09

## 2025-01-08 ENCOUNTER — TRANSFERRED RECORDS (OUTPATIENT)
Dept: HEALTH INFORMATION MANAGEMENT | Facility: CLINIC | Age: 80
End: 2025-01-08
Payer: MEDICARE

## 2025-01-23 ENCOUNTER — PATIENT OUTREACH (OUTPATIENT)
Dept: ONCOLOGY | Facility: CLINIC | Age: 80
End: 2025-01-23
Payer: MEDICARE

## 2025-01-23 ENCOUNTER — TRANSCRIBE ORDERS (OUTPATIENT)
Dept: OTHER | Age: 80
End: 2025-01-23

## 2025-01-23 DIAGNOSIS — H93.A2 PULSATILE TINNITUS, LEFT EAR: Primary | ICD-10-CM

## 2025-01-24 ENCOUNTER — TRANSFERRED RECORDS (OUTPATIENT)
Dept: HEALTH INFORMATION MANAGEMENT | Facility: CLINIC | Age: 80
End: 2025-01-24
Payer: MEDICARE

## 2025-01-27 NOTE — PROGRESS NOTES
Referral received for Radiation Oncology from [facility]. 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 Referral placed for consult for radiation therapy for RT options. Call placed to patient to discuss referral to Radiation Oncology.. Referral instructions updated and sent to NPS for completion and scheduling.

## 2025-01-28 NOTE — TELEPHONE ENCOUNTER
MEDICAL RECORDS REQUEST   Radiation Oncology  [address] [place] [address]  Fax: [phone]          FUTURE VISIT INFORMATION                                                   Gabriel SALGADO Dale, : 1945 scheduled for future visit at [facility] Radiation Oncology    RECORDS REQUESTED FOR VISIT                                                     Action 2025 11:50 PM    Action Taken - Req is [facility]ed to the [facility] for     2025 8:15 AM:  - Records came in for this patient, faxed to HIM and emailed to NN and nurses.   - Reached out to the film room to push images.     8:51 AM:  - Reached out to [facility] for the angiogram report and the path report, if any.    1:11 PM:  - Receive records, faxed to Him and emailed to NN.      Imaging Received  2025 8:26 AM    Action: Images from [facility] received and resolved to PACS.     BRAIN STATUS DETAILS   OFFICE NOTE from PCP External: [facility] Dr. Donna Echavarria   OFFICE NOTE from  ENT External: [facility] 24: Dr. Krystal Hernandez   OPERATIVE/BIOPSY REPORTS External: [facility] 3/16/23: Tympanomastoidectomy  10/27/22: Tympanomastoidectomy  22: canalplasty, meatoplasty, middle ear bx  21: myringotomy   Angiogram Embolization Report/Audiogram Reports (Acoustic Neuroma Only) External: [facility] 23   MEDICATION LIST External: [facility]    LABS     PATHOLOGY REPORTS Report in Ext: [facility] 3/16/23: [id number]  10/27/22: [id number]  22: [id number]  21: [id number]   ANYTHING RELATED TO DIAGNOSIS External: [facility]    IMAGING (NEED IMAGES & REPORT)     CT SCANS PACS 24-3/10/21: CT Orbit/Ear  19-12: CT Head   MRI PACS 23: MR Brain

## 2025-01-29 ENCOUNTER — TELEPHONE (OUTPATIENT)
Dept: RADIATION ONCOLOGY | Facility: CLINIC | Age: 80
End: 2025-01-29
Payer: MEDICARE

## 2025-01-29 DIAGNOSIS — D18.00 GLOMUS TUMOR: Primary | ICD-10-CM

## 2025-01-31 ENCOUNTER — PRE VISIT (OUTPATIENT)
Dept: RADIATION ONCOLOGY | Facility: CLINIC | Age: 80
End: 2025-01-31
Payer: MEDICARE

## 2025-02-12 ENCOUNTER — HOSPITAL ENCOUNTER (OUTPATIENT)
Dept: CT IMAGING | Facility: CLINIC | Age: 80
Discharge: HOME | End: 2025-02-12
Attending: RADIOLOGY
Payer: MEDICARE

## 2025-02-12 DIAGNOSIS — D18.00 GLOMUS TUMOR: ICD-10-CM

## 2025-02-12 PROCEDURE — 70481 CT ORBIT/EAR/FOSSA W/DYE: CPT | Mod: 26 | Performed by: STUDENT IN AN ORGANIZED HEALTH CARE EDUCATION/TRAINING PROGRAM

## 2025-02-12 PROCEDURE — 70481 CT ORBIT/EAR/FOSSA W/DYE: CPT

## 2025-02-12 PROCEDURE — 250N000011 HC RX IP 250 OP 636: Performed by: RADIOLOGY

## 2025-02-12 RX ORDER — IOPAMIDOL 755 MG/ML
67 INJECTION, SOLUTION INTRAVASCULAR ONCE
Status: COMPLETED | OUTPATIENT
Start: 2025-02-12 | End: 2025-02-12

## 2025-02-12 RX ADMIN — IOPAMIDOL 67 ML: 755 INJECTION, SOLUTION INTRAVENOUS at 10:29

## 2025-02-13 NOTE — PROGRESS NOTES
RADIATION ONCOLOGY CONSULTATION  DATE OF VISIT: Feb 14, 2025    Gabriel Hunter  MRN: [id number]    PROBLEM:    Mr. Gabriel Hunter was seen for initial consultation in the Department of Radiation Oncology on Feb 14, 2025 at the request of Dr. Carlitos Collins for left glomus tympanicum, recurrent.    HISTORY OF PRESENT ILLNESS:   Mr. Gabriel Hunter is an 79-year-old man who presents with a recurrent left glomus tympanicum for consideration of radiation.    3/16/2023 revision left tympanomastoidectomy and ossicular chain reconstruction with Dr. Collins.  Pathology demonstrated left middle ear mastoid and tympanic membrane, excision-recurrent glomus tumor consistent with a glomus tympanicum.    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    MRI from 12/6/2023 reportedly showed residual glomus tumor in the left temporal bone.    He cannot hear from his left ear and has left tinnitus which makes it hard for him to concentrate.  He occasionally has sharp pain in the left ear and worsening throbbing headaches.  He has stable positional vertigo for several years but is having worsening balance with multiple falls.  He has seen physical therapy and neuropsychology at the [facility] for this.    Examination demonstrates left facial dyskinesia and mild weakness in the buccal branch.  Microscopic otoscopy of the left ear demonstrated a well-healed postauricular incision and mastoidectomy site.  There is an extremely narrow external auditory canal with normal epithelium.  Tympanic membrane is visualized and has a cartilage graft.    Imaging from 12/9/2024 CT temporal bone showed indeterminant progressive bony changes on the skull base and petrous ridge felt to be unchanged from 6/24/2022.    Was seen by  otolaryngology at [facility] and was not felt to be a candidate for further surgery.  His case was reviewed with neuro otology at [facility] with recommendation for stereotactic radiosurgery.    Prior to his visit with us, his most recent CT temporal bone was reviewed with neuroradiology with recommendation to obtain a repeat CT scan with IV contrast.  This was performed on 2/12/2025 and demonstrated postoperative changes with no definite enhancing lesion identified in the operative field within the limits of the exam.  If    Today,, ***    PAST MEDICAL HISTORY:   Past Medical History:   Diagnosis Date    Cancer (H) 2014    Head injury    Posttraumatic stress disorder  Chronic low back pain  Dyslipidemia  Posterior vitreal detachment  Retention    PAST SURGICAL HISTORY:   Past Surgical History:   Procedure Laterality Date    ABDOMEN SURGERY  1999.    2014    APPENDECTOMY  [place]    BACK SURGERY  2000---2009    lower spine 3 fused vertabrae    COLONOSCOPY      EYE SURGERY  Cataract    GENITOURINARY SURGERY  2014    Prostate cancer    ORTHOPEDIC SURGERY  R knee replac ement   January 2020 right carpal tunnel release  June 2017 surgery for right upper eyelid ptosis and left lower lid ectropion  September 2016 bilateral upper lid blepharoplasty and levator advancement  March 2016 insertion of penile prosthesis  Bilateral cataract surgery February/March 2015 July 22, 2013 radical retropubic prostatectomy with bilateral pelvic lymphadenectomy-?  Radiation***    February 2013 lumbar spine fusion L2-3  May 2012 left carpal tunnel release  August 2008 right knee total arthroplasty  July 1999 posterior lumbar fusion of L1-2, L3-4    CHEMOTHERAPY HISTORY: ***     PAST RADIATION THERAPY HISTORY: ***   Pelvis post prostatectomy    IMPLANTABLE CARDIAC DEVICE:    MEDICATIONS: ***  Current Outpatient Medications   Medication Sig Dispense Refill    albuterol (PROAIR HFA/PROVENTIL HFA/VENTOLIN HFA) 108 (90 Base) MCG/ACT inhaler Inhale 2 puffs  into the lungs every 4 hours as needed for shortness of breath      amLODIPine (NORVASC) 5 MG tablet       armodafinil (NUVIGIL) 250 MG TABS tablet Take 250 mg by mouth every morning      carboxymethylcellulose PF (REFRESH PLUS) 0.5 % ophthalmic solution Place 1 drop into both eyes 3 times daily      chlorthalidone (HYGROTON) 25 MG tablet Take 25 mg by mouth daily      lidocaine (LIDODERM) 5 % patch Apply topically every 24 hours      losartan (COZAAR) 100 MG tablet Take 1 tablet by mouth daily      methocarbamol (ROBAXIN) 500 MG tablet Take 1 tablet by mouth 2 times daily as needed      ondansetron (ZOFRAN ODT) 4 MG ODT tab Place 4 mg under the tongue every 6 hours as needed for nausea      pantoprazole (PROTONIX) 40 MG EC tablet       pregabalin (LYRICA) 25 MG capsule Take 25 mg by mouth 2 times daily      simvastatin (ZOCOR) 40 MG tablet       trospium (SANCTURA) 20 MG tablet       venlafaxine (EFFEXOR-XR) 150 MG 24 hr capsule       Wound Dressings (TROPAZONE EX)           ALLERGIES:   Allergies as of 02/14/2025 - Reviewed 08/13/2024   Allergen Reaction Noted    Penicillins Itching and Anaphylaxis 05/19/2010    Sildenafil Muscle Pain (Myalgia)     Tadalafil Muscle Pain (Myalgia)        SOCIAL HISTORY: ***     Social History     Socioeconomic History    Marital status:      Spouse name: Not on file    Number of children: Not on file    Years of education: Not on file    Highest education level: Not on file   Occupational History    Not on file   Tobacco Use    Smoking status: Never    Smokeless tobacco: Never   Substance and Sexual Activity    Alcohol use: No    Drug use: No    Sexual activity: Not on file   Other Topics Concern    Parent/sibling w/ CABG, MI or angioplasty before 65F 55M? No   Social History Narrative    Not on file     Social Drivers of Health     Financial Resource Strain: Not on file   Food Insecurity: Not on file   Transportation Needs: Not on file   Physical Activity: Not on file  "  Stress: Not on file   Social Connections: Not on file   Interpersonal Safety: Not on file   Housing Stability: Not on file   5 children, chest  and , drove schoolbus and volunteer     FAMILY HISTORY: ***  Family History   Problem Relation Age of Onset    Cancer Mother    Mother lung cancer    REVIEW OF SYMPTOMS:  A full 12-point review of systems was performed. All pertinent positives and negatives are noted in HPI.    FUNCTIONAL STATUS:  ECOG: ***    PHYSICAL EXAMINATION:    There were no vitals taken for this visit.  Exam:  Constitutional: {GENERAL APPEARANCE:459310::\"healthy\",\"alert\",\"no distress\"}  Head: {HEAD EXAM:301::\"Normocephalic. No masses, lesions, tenderness or abnormalities\"}  Neck: {NECK EXAM:304::\"Neck supple. No adenopathy. Thyroid symmetric, normal size\".\"Carotids without bruits.\"}  ENT: {ENT EXAM:5032::\"ENT exam normal, no neck nodes or sinus tenderness\"}  Cardiovascular: {HEART EXAM:501::\"negative\",\"PMI normal. No lifts, heaves, or thrills.  RRR. No murmurs, clicks, gallops or rub\"}  Respiratory: {LUNG EXAM:401::\"negative\",\"Percussion normal. Good diaphragmatic excursion. Lungs are clear to auscultate.\"}  Gastrointestinal: {ABDOMEN EXAM:601::\"Abdomen soft, non-tender. BS normal. No masses, organomegaly\"}  : { Normal Exam Male or Female:470274::\"Deferred\"}  Musculoskeletal: {STONEY EXAM MS/JOINT:169942::\"extremities normal- no gross deformities noted\",\"gait normal\",\"normal muscle tone\"}  Skin: {STONEY SKIN EXAM:563938::\"no suspicious lesions or rashes\"}  Neurologic: {NEURO EXAM:901::\"Gait normal. Reflexes normal and symmetric. Sensation grossly WNL.\"}  Psychiatric: {STONEY PATIENT PSYCH APPEARANCE:725745::\"mentation appears normal\",\"affect normal/bright\"}  Hematologic/Lymphatic/Immunologic: {STONEY EXAM LYMPH:139799::\"normal ant/post cervical, axillary, supraclavicular and inguinal nodes\"}    IMAGING/PATH:   Pathology 3/16/2023: Left middle ear mastoid and tympanic membrane, " excision-recurrent glomus tumor consistent with a glomus tympanicum.  Imaging: per HPI, personally reviewed and in agreement      CT Temporal Bones without Contrast     12/9/2024 11:45 AM     Indication: 1 year surveillance of left glomus tympanicum.   Technique: Performed without IV contrast, including thin-section   imaging through both petrous temporal regions. Dose: DLP:  340.94, mGy.cm/CTDIvol Mean: 53.1, mGy Comparison: 6/24/2022.     Findings: Left temporal bone: Canal up mastoidectomy, new from  6/24/2022. The ossicular chain has apparently been resected with  placement of an ossicular prosthesis which extends into the oval  window. The entire middle ear cleft and mastoid show soft tissue  opacification. No otic capsule erosion is identified. Moderate  soft tissue thickening in lines the external auditory canal.   There is thickening and groundglass attenuation involving the   base of the left middle cranial fossa and extending to the   petrous ridge and tegmen tympani. This has progressed from   6/24/2022. Overall attenuation is reduced in this area, but   without emre bone destruction. I suspect it represents   sclerosing osteitis; it seems far too extensive to represent   recurrent tumor. Fibrous dysplasia can have an identical CT   appearance, but given its relatively rapid interval change this   possibility seems less likely.     Right temporal bone: The middle ear cleft is well-aerated,  including the oval window and round window/sinus tympani areas.  The ossicular chain and tympanic membrane have a normal  appearance. The estimated is well-developed and clear, including  the central mastoid tracts and the peripheral mastoid air cells.  The inner ear structures and IAC are unremarkable. The external  canal is negative.     The visualized portions of the brain and upper soft tissue neck   are unremarkable.     Impression:  Left temporal bone: 1. Interval canal up mastoidectomy. 2. An  ocular prosthesis  is now in place. 3. Soft tissue opacification  of the mastoidectomy bed and middle ear cleft. 4. Indeterminate  progressing bony changes in the skull base and petrous ridge.     Right temporal bone: Unremarkable and unchanged from 6/24/2022.     Primary Interpreting Staff:  JEZ BROWN MD, RADIOLOGIST (Verifier)   Path: per HPI, personally reviewed and in agreement    Labs: per HPI, personally reviewed and in agreement       ASSESSMENT AND PLAN: ***    I have reviewed his images from 2/12/2025 with Dr. Hernandez of Neurosurgery and we both agree that the extent of the glomus tympanicum is not well-defined on the CT scan with contrast.    Prior to recommending radiation treatment, we will obtain an MRI of the skull base with fat-sat and refer the patient to the lateral skull base clinic to see Dr. Hernandez and a neuro oncologist prior to considering gamma knife radiosurgery given the difficulty in locating the recurrent glomus tumor.    We appreciate the opportunity to participate in 's care.  Please call with questions.    {2021 E&M time (Optional):186735}    *** In addition to above, I have reviewed  *** notes,  *** notes, head and neck tumor board notes, and  *** scope examination    Aviva Jacobo MD  Department of Radiation Oncology  [facility]    WILLIAMS العلي MD Neuro otology [facility]  Anoop Avendano MD Primary Care [facility]     slices - fat sat etc...; Glomus tumor.     ICD-10: Glomus tumor.     Comparison: Temporal CT from 12/9/2024 and MRI from 12/6/2023.     Technique: Axial T2-weighted images with fat saturation, thin-section  T2-weighted balanced echo images, and axial T1-weighted images were  obtained without intravenous contrast from the level of the foramen  magnum to the corpus callosum.  Axial diffusion-weighted with ADC map  images of the whole brain and sagittal T1-weighted images through the  mid skull were also obtained without intravenous contrast. Following  intravenous administration of gadolinium, axial and coronal  T1-weighted images with fat saturation were obtained with focus on the  region of the 3rd-6th cranial nerves.  Contrast: 10 mL GADAVIST     Findings:    Postsurgical changes of left canal wall up mastoidectomy with fluid  collection within the surgery bed. There is linear enhancement along  the left internal auditory canal in the vicinity geniculate ganglia  and questionably greater petrosal nerve. There is also second  subcentimeter enhancing area within the inferior mastoid (12/14,  16/22), decreased from prior. Irregular dural thickening and  enhancement along the left medial cranial fossa and T2 hyperintense  left temporal lobe signal, similar to prior.     No midline shift or extra-axial fluid collection. Multiple confluent  and foci of T2 hyperintensities within the bilateral cerebral white  matter, most likely represents chronic small vessel ischemic disease.                                                                 Impression: Little change in findings compared to 12/6/2023.   - Status post left canal wall up mastoidectomy with unchanged left  internal auditory canal, geniculate ganglion. Another small enhancing  area within the inferior mastoid, decreased from prior. Unchanged  dural thickening and enhancement along the left middle cranial fossa  and associated T2 hyperintense signal in the left  temporal lobe.     JS AVILES MD MRI

## 2025-02-14 ENCOUNTER — OFFICE VISIT (OUTPATIENT)
Dept: RADIATION ONCOLOGY | Facility: CLINIC | Age: 80
End: 2025-02-14
Attending: RADIOLOGY
Payer: MEDICARE

## 2025-02-14 VITALS
WEIGHT: 262.6 LBS | OXYGEN SATURATION: 95 % | BODY MASS INDEX: 40.52 KG/M2 | HEART RATE: 71 BPM | DIASTOLIC BLOOD PRESSURE: 79 MMHG | SYSTOLIC BLOOD PRESSURE: 146 MMHG

## 2025-02-14 DIAGNOSIS — D18.00 GLOMUS TUMOR: Primary | ICD-10-CM

## 2025-02-14 PROCEDURE — 3078F DIAST BP <80 MM HG: CPT | Performed by: RADIOLOGY

## 2025-02-14 PROCEDURE — 99417 PROLNG OP E/M EACH 15 MIN: CPT | Performed by: RADIOLOGY

## 2025-02-14 PROCEDURE — 99205 OFFICE O/P NEW HI 60 MIN: CPT | Performed by: RADIOLOGY

## 2025-02-14 PROCEDURE — G0463 HOSPITAL OUTPT CLINIC VISIT: HCPCS | Performed by: RADIOLOGY

## 2025-02-14 PROCEDURE — 1125F AMNT PAIN NOTED PAIN PRSNT: CPT | Performed by: RADIOLOGY

## 2025-02-14 PROCEDURE — 3077F SYST BP >= 140 MM HG: CPT | Performed by: RADIOLOGY

## 2025-02-14 RX ORDER — DOXYCYCLINE HYCLATE 20 MG
20 TABLET ORAL DAILY
COMMUNITY
Start: 2025-01-13

## 2025-02-14 ASSESSMENT — PAIN SCALES - GENERAL: PAINLEVEL_OUTOF10: MODERATE PAIN (5)

## 2025-02-14 NOTE — LETTER
2/14/2025      Gabriel Hunter  [address]  [place] [address]      Dear Colleague,    Thank you for referring your patient, Gabriel Hunter, to the [facility] RADIATION ONCOLOGY. Please see a copy of my visit note below.    RADIATION ONCOLOGY CONSULTATION  DATE OF VISIT: Feb 14, 2025    Gabriel Hunter  MRN: [id number]    PROBLEM:    Mr. Gabriel Hunter was seen for initial consultation in the Department of Radiation Oncology on Feb 14, 2025 at the request of Dr. Carlitos Collins for left glomus tympanicum, recurrent.    HISTORY OF PRESENT ILLNESS:   Mr. Gabriel Hunter is an 79-year-old man who presents with a recurrent left glomus tympanicum for consideration of radiation.    3/16/2023 revision left tympanomastoidectomy and ossicular chain reconstruction with Dr. Collins.  Pathology demonstrated left middle ear mastoid and tympanic membrane, excision-recurrent glomus tumor consistent with a glomus tympanicum.    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    MRI from 12/6/2023 reportedly showed residual glomus tumor in the left temporal bone.    He cannot hear from his left ear and has left tinnitus which makes it hard for him to concentrate.  He occasionally has sharp pain in the left ear and worsening throbbing headaches.  He has stable positional vertigo for several years but is having worsening balance with multiple falls.  He has seen physical therapy and neuropsychology at the [facility] for this.    Examination demonstrates left facial dyskinesia and mild weakness in the buccal branch.  Microscopic otoscopy of the left ear demonstrated a well-healed postauricular incision and mastoidectomy site.  There is an extremely narrow external auditory canal with normal  epithelium.  Tympanic membrane is visualized and has a cartilage graft.    Imaging from 12/9/2024 CT temporal bone showed indeterminant progressive bony changes on the skull base and petrous ridge felt to be unchanged from 6/24/2022.    Was seen by otolaryngology at [facility] and was not felt to be a candidate for further surgery.  His case was reviewed with neuro otology at [facility] with recommendation for stereotactic radiosurgery.    Prior to his visit with us, his most recent CT temporal bone was reviewed with Neuroradiology with recommendation to obtain a repeat CT scan with IV contrast.  This was performed on 2/12/2025 and demonstrated postoperative changes with no definite enhancing lesion identified in the operative field within the limits of the exam.  If    Today, he feels well.  He does note increased pain deep within the left ear but otherwise denies any imbalance, nausea, vomiting, headaches.    PAST MEDICAL HISTORY:   Past Medical History:   Diagnosis Date     Cancer (H) 2014     Head injury    Posttraumatic stress disorder  Chronic low back pain  Dyslipidemia  Posterior vitreal detachment  Retention    PAST SURGICAL HISTORY:   Past Surgical History:   Procedure Laterality Date     ABDOMEN SURGERY  1999.    2014     APPENDECTOMY  [place]     BACK SURGERY  2000---2009    lower spine 3 fused vertabrae     COLONOSCOPY       EYE SURGERY  Cataract     GENITOURINARY SURGERY  2014    Prostate cancer     ORTHOPEDIC SURGERY  R knee replac ement   January 2020 right carpal tunnel release  June 2017 surgery for right upper eyelid ptosis and left lower lid ectropion  September 2016 bilateral upper lid blepharoplasty and levator advancement  March 2016 insertion of penile prosthesis  Bilateral cataract surgery February/March 2015 July 22, 2013 radical retropubic prostatectomy with bilateral pelvic lymphadenectomy    February 2013 lumbar spine fusion L2-3  May 2012 left carpal tunnel release  August 2008 right knee total  arthroplasty  July 1999 posterior lumbar fusion of L1-2, L3-4    CHEMOTHERAPY HISTORY: None    PAST RADIATION THERAPY HISTORY:    Pelvis post prostatectomy at [facility]    IMPLANTABLE CARDIAC DEVICE: None    MEDICATIONS:   Current Outpatient Medications   Medication Sig Dispense Refill     albuterol (PROAIR HFA/PROVENTIL HFA/VENTOLIN HFA) 108 (90 Base) MCG/ACT inhaler Inhale 2 puffs into the lungs every 4 hours as needed for shortness of breath       amLODIPine (NORVASC) 5 MG tablet        armodafinil (NUVIGIL) 250 MG TABS tablet Take 250 mg by mouth every morning       carboxymethylcellulose PF (REFRESH PLUS) 0.5 % ophthalmic solution Place 1 drop into both eyes 3 times daily       chlorthalidone (HYGROTON) 25 MG tablet Take 25 mg by mouth daily       lidocaine (LIDODERM) 5 % patch Apply topically every 24 hours       losartan (COZAAR) 100 MG tablet Take 1 tablet by mouth daily       methocarbamol (ROBAXIN) 500 MG tablet Take 1 tablet by mouth 2 times daily as needed       ondansetron (ZOFRAN ODT) 4 MG ODT tab Place 4 mg under the tongue every 6 hours as needed for nausea       pantoprazole (PROTONIX) 40 MG EC tablet        pregabalin (LYRICA) 25 MG capsule Take 25 mg by mouth 2 times daily       simvastatin (ZOCOR) 40 MG tablet        trospium (SANCTURA) 20 MG tablet        venlafaxine (EFFEXOR-XR) 150 MG 24 hr capsule        Wound Dressings (TROPAZONE EX)         ALLERGIES:   Allergies as of 02/14/2025 - Reviewed 08/13/2024   Allergen Reaction Noted     Penicillins Itching and Anaphylaxis 05/19/2010     Sildenafil Muscle Pain (Myalgia)      Tadalafil Muscle Pain (Myalgia)      SOCIAL HISTORY:      Social History     Socioeconomic History     Marital status:      Spouse name: Not on file     Number of children: Not on file     Years of education: Not on file     Highest education level: Not on file   Occupational History     Not on file   Tobacco Use     Smoking status: Never     Smokeless tobacco: Never    Substance and Sexual Activity     Alcohol use: No     Drug use: No     Sexual activity: Not on file   Other Topics Concern     Parent/sibling w/ CABG, MI or angioplasty before 65F 55M? No   Social History Narrative     Not on file     Social Drivers of Health     Financial Resource Strain: Not on file   Food Insecurity: Not on file   Transportation Needs: Not on file   Physical Activity: Not on file   Stress: Not on file   Social Connections: Not on file   Interpersonal Safety: Not on file   Housing Stability: Not on file   5 children,  and , drove schoolbus and volunteer     FAMILY HISTORY:   Family History   Problem Relation Age of Onset     Cancer Mother    Mother lung cancer    REVIEW OF SYMPTOMS:  A full 12-point review of systems was performed. All pertinent positives and negatives are noted in HPI.    FUNCTIONAL STATUS:  ECO    PHYSICAL EXAMINATION:    BP (!) 146/79 (BP Location: Left arm, Patient Position: Sitting, Cuff Size: Adult Large)   Pulse 71   Wt 119.1 kg (262 lb 9.6 oz)   SpO2 95%   BMI 40.52 kg/m    Exam:  General: Alert, oriented, in no acute distress  HEENT: Normocephalic, sclera anicteric, well-healed left postauricular incision  Neck: No adenopathy  Respiratory: Breathing comfortably on room air, no wheezing or increased work of breathing  Cardiovascular: Regular rate, extremities warm, well-perfused  Extremities: Without cyanosis or edema  Musculoskeletal: Normal muscle bulk and tone  Neuro: Cranial nerves II through XII are grossly intact except for diminished hearing left ear, normal gait    IMAGING/PATH:   Pathology 3/16/2023: Left middle ear mastoid and tympanic membrane, excision-recurrent glomus tumor consistent with a glomus tympanicum.  Imaging: I have personally reviewed patient's imaging today including the 2024 CT temporal bone without contrast as well as the more recent CT temporal bone with contrast on 2025.  Additionally, I have  discussed these images with Dr. Hernandez of Neurosurgery and we are both in agreement that the left glomus tympanicum cannot be easily visualized with the studies.    CT Temporal Bones without Contrast   12/9/2024 11:45 AM     Indication: 1 year surveillance of left glomus tympanicum.   Technique: Performed without IV contrast, including thin-section   imaging through both petrous temporal regions. Dose: DLP:  340.94, mGy.cm/CTDIvol Mean: 53.1, mGy Comparison: 6/24/2022.     Findings: Left temporal bone: Canal up mastoidectomy, new from  6/24/2022. The ossicular chain has apparently been resected with  placement of an ossicular prosthesis which extends into the oval  window. The entire middle ear cleft and mastoid show soft tissue  opacification. No otic capsule erosion is identified. Moderate  soft tissue thickening in lines the external auditory canal.   There is thickening and groundglass attenuation involving the   base of the left middle cranial fossa and extending to the   petrous ridge and tegmen tympani. This has progressed from   6/24/2022. Overall attenuation is reduced in this area, but   without emre bone destruction. I suspect it represents   sclerosing osteitis; it seems far too extensive to represent   recurrent tumor. Fibrous dysplasia can have an identical CT   appearance, but given its relatively rapid interval change this   possibility seems less likely.     Right temporal bone: The middle ear cleft is well-aerated,  including the oval window and round window/sinus tympani areas.  The ossicular chain and tympanic membrane have a normal  appearance. The estimated is well-developed and clear, including  the central mastoid tracts and the peripheral mastoid air cells.  The inner ear structures and IAC are unremarkable. The external  canal is negative.     The visualized portions of the brain and upper soft tissue neck   are unremarkable.     Impression:  Left temporal bone: 1. Interval canal up  mastoidectomy. 2. An  ocular prosthesis is now in place. 3. Soft tissue opacification  of the mastoidectomy bed and middle ear cleft. 4. Indeterminate  progressing bony changes in the skull base and petrous ridge.     Right temporal bone: Unremarkable and unchanged from 6/24/2022.     Primary Interpreting Staff:  JEZ BROWN MD, RADIOLOGIST (Verifier)     Labs: Reviewed in the medical record    ASSESSMENT AND PLAN:   Mr. Gabriel Hunter is an 79-year-old man with recurrent left glomus tympanicum.  I briefly discussed the potential role for radiation as treatment for recurrent glomus tympanicum focusing on Gamma Knife stereotactic radiosurgery.  I also discussed the results of his current imaging which do not help us localize a target for treatment.    Additionally, I have reviewed his images from 2/12/2025 with Dr. Hernandez of Neurosurgery, who treats Gamma Knife SRS, and we both agree that the extent of the glomus tympanicum is not well-defined on the CT scan with contrast.    Prior to recommending radiation treatment, we will obtain an MRI of the skull base with fat-sat and refer the patient to the lateral skull base clinic to see Dr. Hernandez and Dr. Lyle given the difficulty in locating the recurrent glomus tumor.  It may be that he is still considered a surgical candidate with possible postoperative radiation.      Following evaluation and skull base clinic, we will hopefully be able to finalize a treatment plan which may include surgery.    We appreciate the opportunity to participate in Mr. Hunter's care.  Please call with questions.    75 minutes spent by me on the date of the encounter doing chart review, history and exam, documentation and further activities per the note.    Aviva Jacobo MD  Department of Radiation Oncology  [facility]    WILLIAMS العلي MD Neuro otology [facility]  Anoop Avendano MD Primary Care [facility]    This note was dictated with voice recognition software and then  edited. Please excuse any unintentional errors.     ADDENDUM  MR SKULL BASE W/O & W CONTRAST 2/17/2025 10:43 AM     History:  Lt Glomus tympanicum S/P 2 previous surgeries now recurrent  - imaging to define tumor extent/anatomy for GammaKnife - unable to  see on contrast CT - 1 mm slices - fat sat etc...; Glomus tumor.     ICD-10: Glomus tumor.     Comparison: Temporal CT from 12/9/2024 and MRI from 12/6/2023.     Technique: Axial T2-weighted images with fat saturation, thin-section  T2-weighted balanced echo images, and axial T1-weighted images were  obtained without intravenous contrast from the level of the foramen  magnum to the corpus callosum.  Axial diffusion-weighted with ADC map  images of the whole brain and sagittal T1-weighted images through the  mid skull were also obtained without intravenous contrast. Following  intravenous administration of gadolinium, axial and coronal  T1-weighted images with fat saturation were obtained with focus on the  region of the 3rd-6th cranial nerves.  Contrast: 10 mL GADAVIST     Findings:    Postsurgical changes of left canal wall up mastoidectomy with fluid  collection within the surgery bed. There is linear enhancement along  the left internal auditory canal in the vicinity geniculate ganglia  and questionably greater petrosal nerve. There is also second  subcentimeter enhancing area within the inferior mastoid (12/14,  16/22), decreased from prior. Irregular dural thickening and  enhancement along the left medial cranial fossa and T2 hyperintense  left temporal lobe signal, similar to prior.     No midline shift or extra-axial fluid collection. Multiple confluent  and foci of T2 hyperintensities within the bilateral cerebral white  matter, most likely represents chronic small vessel ischemic disease.                                                                 Impression: Little change in findings compared to 12/6/2023.   - Status post left canal wall up  "mastoidectomy with unchanged left  internal auditory canal, geniculate ganglion. Another small enhancing  area within the inferior mastoid, decreased from prior. Unchanged  dural thickening and enhancement along the left middle cranial fossa  and associated T2 hyperintense signal in the left temporal lobe.     JS AVILES MD MRI    Considerations for radiation treatment   Pregnancy status: Male   Implanted Cardiac Devices: No   Any previous radiation therapy: [facility] unsure.     Oncology Rooming Note    February 14, 2025 11:20 AM   Gabriel Hunter is a 79 year old male who presents for:    Chief Complaint   Patient presents with     Oncology Clinic Visit     New consult     Initial Vitals: BP (!) 146/79 (BP Location: Left arm, Patient Position: Sitting, Cuff Size: Adult Large)   Pulse 71   Wt 119.1 kg (262 lb 9.6 oz)   SpO2 95%   BMI 40.52 kg/m   Estimated body mass index is 40.52 kg/m  as calculated from the following:    Height as of 8/13/24: 1.715 m (5' 7.5\").    Weight as of this encounter: 119.1 kg (262 lb 9.6 oz). Body surface area is 2.38 meters squared.  Moderate Pain (5) Comment: Data Unavailable   No LMP for male patient.  Allergies reviewed: Yes  Medications reviewed: Yes    Medications: Medication refills not needed today.  Pharmacy name entered into EPIC:    [facility] - [place], [place] - [address]  [id number] - [place], [place] - [address] [facility] [address]    Frailty Screening:   Is the patient here for a new oncology consult visit in cancer care? 2. No    PHQ9:  Did this patient require a PHQ9?: Unable to complete      Clinical concerns: Multiple falls, last fall was few weeks ago, didn't hit head.  sharp pain in left ear, comes and goes, deep bone pain. Had surgery in that ear twice already. No hearing in that ear. Tinnitis severe L ear, moderate right. nerve got messed up in face, has some trouble " drinking and talking at times, water runs out of corner of mouth  ear. moderate in feet to knee, mild in hands. Neurologist appt coming up. Poor circulation in lower legs, R foot is different color per pt, and has feet and ankle swelling. Urinary urgency.  Dr Jacobo was notified.      Nae Anderson, RN  Radiation Nurse              Again, thank you for allowing me to participate in the care of your patient.        Sincerely,        Aviva Jacobo MD    Electronically signed

## 2025-02-14 NOTE — PROGRESS NOTES
"Considerations for radiation treatment   Pregnancy status: Male   Implanted Cardiac Devices: No   Any previous radiation therapy: Yes, [facility], unsure.     Oncology Rooming Note    February 14, 2025 11:20 AM   Gabriel Hunter is a 79 year old male who presents for:    Chief Complaint   Patient presents with    Oncology Clinic Visit     New consult     Initial Vitals: BP (!) 146/79 (BP Location: Left arm, Patient Position: Sitting, Cuff Size: Adult Large)   Pulse 71   Wt 119.1 kg (262 lb 9.6 oz)   SpO2 95%   BMI 40.52 kg/m   Estimated body mass index is 40.52 kg/m  as calculated from the following:    Height as of 8/13/24: 1.715 m (5' 7.5\").    Weight as of this encounter: 119.1 kg (262 lb 9.6 oz). Body surface area is 2.38 meters squared.  Moderate Pain (5) Comment: Data Unavailable   No LMP for male patient.  Allergies reviewed: Yes  Medications reviewed: Yes    Medications: Medication refills not needed today.  Pharmacy name entered into EPIC:    [facility] - [place], [place] - [address]  [id number] - [place], [place] - [address] [facility] [address]    Frailty Screening:   Is the patient here for a new oncology consult visit in cancer care? 2. No    PHQ9:  Did this patient require a PHQ9?: Unable to complete      Clinical concerns: Multiple falls, last fall was few weeks ago, didn't hit head.  sharp pain in left ear, comes and goes, deep bone pain. Had surgery in that ear twice already. No hearing in that ear. Tinnitis severe L ear, moderate right. nerve got messed up in face, has some trouble drinking and talking at times, water runs out of corner of mouth  ear. moderate in feet to knee, mild in hands. Neurologist appt coming up. Poor circulation in lower legs, R foot is different color per pt, and has feet and ankle swelling. Urinary urgency.  Dr Jacobo was notified.      Nae Anderson, RN  Radiation Nurse            "

## 2025-02-17 ENCOUNTER — HOSPITAL ENCOUNTER (OUTPATIENT)
Dept: MRI IMAGING | Facility: CLINIC | Age: 80
Discharge: HOME | End: 2025-02-17
Attending: RADIOLOGY | Admitting: RADIOLOGY
Payer: MEDICARE

## 2025-02-17 DIAGNOSIS — D18.00 GLOMUS TUMOR: ICD-10-CM

## 2025-02-17 PROCEDURE — 70553 MRI BRAIN STEM W/O & W/DYE: CPT

## 2025-02-17 PROCEDURE — 255N000002 HC RX 255 OP 636: Performed by: RADIOLOGY

## 2025-02-17 PROCEDURE — A9585 GADOBUTROL INJECTION: HCPCS | Performed by: RADIOLOGY

## 2025-02-17 PROCEDURE — 70553 MRI BRAIN STEM W/O & W/DYE: CPT | Mod: 26 | Performed by: RADIOLOGY

## 2025-02-17 RX ORDER — GADOBUTROL 604.72 MG/ML
10 INJECTION INTRAVENOUS ONCE
Status: COMPLETED | OUTPATIENT
Start: 2025-02-17 | End: 2025-02-17

## 2025-02-17 RX ADMIN — GADOBUTROL 10 ML: 604.72 INJECTION INTRAVENOUS at 10:44

## 2025-02-17 NOTE — TELEPHONE ENCOUNTER
FUTURE VISIT INFORMATION      FUTURE VISIT INFORMATION:  Date: 3/4/25  Time: 1:30 PM  Location: [facility]   REFERRAL INFORMATION:  Referring provider:  Aviva Jacobo MD  Referring providers clinic:  [facility] RADIATION ONCOLOGY   Reason for visit/diagnosis:  Globus tumor- Referred by Aviva Jacobo MD in [facility] RADIATION ONCOLOGY     RECORDS REQUESTED FROM      Clinic name Comments Records Status Imaging Status   [facility] RADIATION ONCOLOGY  [facility]14[facility]25 [facility] base  2/12/25 CT temporal [facility] PACS   [facility] 6/1/23 progress notes Scanned in    [facility] Op Report 3/16/23: Tympanomastoidectomy  10/27/22: Tympanomastoidectomy  7/14/22: canalplasty, meatoplasty, middle ear bx  7/1/21: myringotomy Scanned in    [facility] 12/9/24-3/10/21: CT Orbit/Ear  1/28/19-1/2/12: CT Head  12/6/23: MR Brain  Scanned in PACS

## 2025-02-21 ENCOUNTER — TRANSFERRED RECORDS (OUTPATIENT)
Dept: HEALTH INFORMATION MANAGEMENT | Facility: CLINIC | Age: 80
End: 2025-02-21

## 2025-02-26 DIAGNOSIS — Z01.10 ENCOUNTER FOR HEARING EXAMINATION: Primary | ICD-10-CM

## 2025-03-04 ENCOUNTER — PRE VISIT (OUTPATIENT)
Dept: OTOLARYNGOLOGY | Facility: CLINIC | Age: 80
End: 2025-03-04

## 2025-03-04 ENCOUNTER — OFFICE VISIT (OUTPATIENT)
Dept: AUDIOLOGY | Facility: CLINIC | Age: 80
End: 2025-03-04
Payer: COMMERCIAL

## 2025-03-14 ENCOUNTER — TRANSFERRED RECORDS (OUTPATIENT)
Dept: HEALTH INFORMATION MANAGEMENT | Facility: CLINIC | Age: 80
End: 2025-03-14

## 2025-04-22 ENCOUNTER — TRANSFERRED RECORDS (OUTPATIENT)
Dept: HEALTH INFORMATION MANAGEMENT | Facility: CLINIC | Age: 80
End: 2025-04-22
Payer: MEDICARE

## 2025-04-22 ENCOUNTER — TRANSCRIBE ORDERS (OUTPATIENT)
Dept: OTHER | Age: 80
End: 2025-04-22

## 2025-04-22 DIAGNOSIS — M54.50 LOW BACK PAIN, UNSPECIFIED: Primary | ICD-10-CM

## 2025-05-28 ENCOUNTER — TRANSCRIBE ORDERS (OUTPATIENT)
Dept: OTHER | Age: 80
End: 2025-05-28

## 2025-05-28 DIAGNOSIS — M54.50 LOW BACK PAIN: Primary | ICD-10-CM

## 2025-05-29 ENCOUNTER — TELEPHONE (OUTPATIENT)
Dept: ORTHOPEDICS | Facility: CLINIC | Age: 80
End: 2025-05-29
Payer: MEDICARE

## 2025-05-29 NOTE — TELEPHONE ENCOUNTER
What is the Concern:  pt has symptoms of occasionally leaking urine and stool.  Started wearing Depends.   Next appt with Dr. Urbina is in July.  Spine decision tree does not let me select him when pt has these red flags.  Please review pt.  Pt would like a sooner appt    Body part affected:  low back  Has Patient been evaluated for condition? yes  Location the patient was evaluated and treated for the condition?   [facility]  Who is referring provider, (name and clinic location) Mandi Koehler  What images have been done? MRI; Location and City where images were taken:     Could we send this information to you in [employer] or would you prefer to receive a phone call?:   Patient would prefer a phone call   Okay to leave a detailed message?: Yes at Cell number on file:    Telephone Information:   Mobile [phone]

## 2025-05-29 NOTE — TELEPHONE ENCOUNTER
See phone message from call center red flag protocol message because pt discussed Urine & stool.  Wants sooner appt.  [facility] from [facility] on [facility] & yesterday 5-28-25.    Last seen in clinic Aug 2024 DOS 2013 Fusion Lumbar done at [facility].    I called pt to assess symptoms.  C/O LBP & Legs & feet numbness.  Stated Urgency of Urine started about 1.5 mo ago , can feel needs to Urinate & Leaks urine & can't stop it.  Stated HX Prostate Cancer with Prostatectomy surgery.  Denies numbness in penile area.    Stated not constant,  does not happen all the time.  Stated loose stool happened 2 weeks ago 3-4 times & None since then & pt stated pharmacist told pt that the recent changes in medications could be causing loose stool.    I told pt I will move appt sooner from July to 6-19-25 when  is back & asked pt to sign [facility]RI at [facility] to have them fax EMG report & MRI report & progress notes to our ortho clinic faX# [phone] & pt agreed.    Provided our Ortho clinic ph#[phone].    I sent message to our rad coord to get [facility] from [facility] before appt.    Reviewed with pt if incontinence of either bowel or bladder changes then go to [facility] immediately  & why & pt understood.  Call back prn.  Pt agreed.    Shanae Carlin RN.

## 2025-06-05 DIAGNOSIS — S32.018A OTHER CLOSED FRACTURE OF FIRST LUMBAR VERTEBRA, INITIAL ENCOUNTER (H): ICD-10-CM

## 2025-06-05 DIAGNOSIS — S32.019D CLOSED FRACTURE OF FIRST LUMBAR VERTEBRA WITH ROUTINE HEALING, UNSPECIFIED FRACTURE MORPHOLOGY, SUBSEQUENT ENCOUNTER: Primary | ICD-10-CM

## 2025-06-18 NOTE — PROGRESS NOTES
In-Person Visit    Spine Surg Hx:   ~1999 - AP fusion L1-2 and L3-4, with unilateral translaminar facet screw fixation; implantation of bone stim (Dr. Dariusz Hill, [facility]).  02/25/2013 - Part 1: LLIF (R approach) L2-3; Right ant ICBG harvest.  Part 2: Bilateral PPS L2-3 (Carlitos, [facility]).  [Implants: NuVasive XLIF system, CoRoent 22mm wide PEEK cage; NuVasive Precept perc screw system, 5.5 mm titanium rods x2].    No chief complaint on file.      Last Visit Date: 8/13/2024 (c/o Denisse Devlin)  Previous Impression:  79/M with  L1 fracture, minimally displaced, stable . Improved pain.  History of previous lumbar fusions, fused L1-4  Previous Plan:  He can follow-up on an as-needed basis.       S>  79 year old male, recheck low back pain    Accompanied by his wife today    Primarily here today as a referral[facility]  Continuing to report ongoing chronic issues of balance and neuropathy.  Feels that these are unchanged from his last visit.  Pain from his L1 fracture has resolved.  Denies any back pain.  Denies any new numbness, tingling, or weakness.  Notes that he has always had a foot drop on the left  Per report, patient did [facility] that provided him with 0% relief.  Unable to find record of this in the chart.  [facility] who did not feel that any sort of surgical intervention would be beneficial to the patient.      Oswestry (VASQUEZ) Questionnaire        6/16/2025    11:40 AM   OSWESTRY DISABILITY INDEX   Count 9    Sum 25    Oswestry Score (%) 55.56 %        Patient-reported        VASQUEZ 11/7/17                    35.56%  VASQUEZ 6/22/24                    78%  VASQUEZ 8/13/24                    33%    Neck Disability Index (NDI) Questionnaire         No data to display                          PROMIS-10 Scores             Physical Examination:    Alert, oriented x 3, cooperative.  Not in CP  distress.  There were no vitals taken for this visit.  Ambulates independently.   Grossly neurologically intact.  5 out of 5 strength to L2, L3, L5.  Right L4 5 out of 5 strength.  Left L4 0 out of 5 strength, foot drop.  Sensation is grossly intact to light touch in all nerve distributions    Imaging:    EOS imaging dated 06/19/2025 demonstrate satisfactory global alignment.  Unchanged from prior.  MRI of the lumbar spine completed in March and reviewed today demonstrating mild to moderate central stenosis at the T12-L1 level.  No significant amount of foraminal stenosis at this level.  No significant amount of central stenosis at the lower lumbar levels    Assessment:    79/M with  1. L1 fracture, minimally displaced, stable . Improved pain.  2. History of previous lumbar fusions, fused L1-4  3.  Chronic balance issues and neuropathy    Plan:    Discussed at length the patient's symptoms and imaging findings.  His symptoms appear to be chronic in nature, and these are unchanged per him and his wife.  Discussed that his MRI is reassuring today.  We do not see any findings that would warrant any sort of surgical intervention.    Discussed that we could consider an SI joint injection to see if this is driving any component of his pain and symptoms.  The patient would like to proceed with this.    1. R fluoroscopic guided SI joint injection, therapeutic  2. Follow-up via video visit to discuss results of injection    Isidro Pepper MD  Orthopaedic Surgery PGY-5      Chepe Urbina MD    Orthopaedic Spine Surgery  Dept Orthopaedic Surgery, [facility]  [phone] office, [phone] pager  [email or web address]    anything to offer to him from surgical standpoint.    Isidro Pepper MD  Orthopaedic Surgery PGY-5    Attestation:  I (Dr. Chepe Urbina - Spine Surgeon) have personally evaluated patient with PGY-5 Evgeny, and agree with findings and plan outlined in the note, which I also edited.  I discussed at length with the patient/family, explained the nature of spinal condition, and formulated workup and/or treatment plan together.  All questions were answered to the best of my ability and to patient's apparent satisfaction.     >25 minutes spent on the date of the encounter doing chart review/review of outside records/review of test results/interpretation of tests/patient visit/documentation/discussion with other provider(s)/discussion with patient and family.    Chepe Urbina MD    Orthopaedic Spine Surgery  Dept Orthopaedic Surgery, [facility]  [phone] office, [phone] pager  [email or web address]

## 2025-06-19 ENCOUNTER — OFFICE VISIT (OUTPATIENT)
Dept: ORTHOPEDICS | Facility: CLINIC | Age: 80
End: 2025-06-19
Payer: COMMERCIAL

## 2025-06-19 ENCOUNTER — TELEPHONE (OUTPATIENT)
Dept: PALLIATIVE MEDICINE | Facility: CLINIC | Age: 80
End: 2025-06-19

## 2025-06-19 ENCOUNTER — ANCILLARY PROCEDURE (OUTPATIENT)
Dept: GENERAL RADIOLOGY | Facility: CLINIC | Age: 80
End: 2025-06-19
Attending: ORTHOPAEDIC SURGERY
Payer: MEDICARE

## 2025-06-19 DIAGNOSIS — S32.019D CLOSED FRACTURE OF FIRST LUMBAR VERTEBRA WITH ROUTINE HEALING, UNSPECIFIED FRACTURE MORPHOLOGY, SUBSEQUENT ENCOUNTER: ICD-10-CM

## 2025-06-19 DIAGNOSIS — G89.29 CHRONIC RIGHT SACROILIAC JOINT PAIN: Primary | ICD-10-CM

## 2025-06-19 DIAGNOSIS — S32.018A OTHER CLOSED FRACTURE OF FIRST LUMBAR VERTEBRA, INITIAL ENCOUNTER (H): ICD-10-CM

## 2025-06-19 DIAGNOSIS — M53.3 CHRONIC RIGHT SACROILIAC JOINT PAIN: Primary | ICD-10-CM

## 2025-06-19 PROCEDURE — 99214 OFFICE O/P EST MOD 30 MIN: CPT | Mod: GC | Performed by: ORTHOPAEDIC SURGERY

## 2025-06-19 PROCEDURE — 72082 X-RAY EXAM ENTIRE SPI 2/3 VW: CPT | Performed by: STUDENT IN AN ORGANIZED HEALTH CARE EDUCATION/TRAINING PROGRAM

## 2025-06-19 PROCEDURE — 77073 BONE LENGTH STUDIES: CPT | Performed by: STUDENT IN AN ORGANIZED HEALTH CARE EDUCATION/TRAINING PROGRAM

## 2025-06-19 NOTE — TELEPHONE ENCOUNTER
"Screening Questions for Radiology Injections:    Injection to be done at which interventional clinic site? [facility]    If choosing [facility] for location, please inform patient:  \"[facility] is a Hospital based clinic. Before your visit, you should check with your insurance about how it covers the charges for facility services in a hospital-based clinic.     Procedure ordered by Carlitos    Procedure ordered? XR Sacroiliac Therapeutic Injection Right   Transforaminal Cervical JOSE A - [place] to [facility] - No [facility] providers perform this procedure    What insurance would patient like us to bill for this procedure? [facility]  IF SCHEDULING IN [place] PAIN OR SPINE PLEASE SCHEDULE AT LEAST 7-10 BUSINESS DAYS OUT SO A PA CAN BE OBTAINED  Worker's comp or MVA (motor vehicle accident) -Any injection DO NOT SCHEDULE and route to Ira Foote.    [employer] insurance - For ALL INJECTIONS DO NOT SCHEDULE and route to Maia Gan.     ALL BCBS, [employer] and [employer] - DO NOT SCHEDULE and route to Maia Gan  MEDICA- ALL INJECTIONS- route to Maia Gan    Is patient scheduled at [facility]? no   If YES, route every encounter to [facility] SPINE CENTER CARE NAVIGATION POOL [[phone]]    Is an  needed? No     Patient has a  home? (Review Grid) YES: ok    Any chance of pregnancy? NO   If YES, do NOT schedule and route to RN pool  - Dr. Ball route to PM&R Nurse  [[id number]]      Is patient actively being treated for cancer or immunocompromised? No  If YES, do NOT schedule and route to RN pool/ Dr. Ball's Team    Does the patient have a bleeding or clotting disorder? No   If YES, okay to schedule AND route to RN nurse / Dr. Ball's Team   (For any patients with platelet count <100, RN must forward to provider)    Is patient taking any Blood Thinners OR Antiplatelet medication?  No   If hold needed, do NOT schedule, route to RN pool/ Dr. Ball's Team  Examples: "   Blood Thinners: (Coumadin, Warfarin, Jantoven, Pradaxa, Xarelto, Eliquis, Edoxaban, Enoxaparin, Lovenox, Heparin, Arixtra, Fondaparinux or Fragmin)  Antiplatelet Medications: (Plavix, Brilinta or Effient)     Is patient taking any aspirin products (includes: Aspirin 81 mg, Excedrin, and Fiorinal)? No.    If yes route to RN pool/ Dr. Ball's Team - Do not schedule    Is patient taking any GLP-1 Antagonist (hold needed for sedation patients only) No   (semaglutide (Ozempic, Wegovy), dulaglutide (Trulicity), exenatide ER (Bydureon), tirzepatide (Mounjaro), Liraglutide (Saxenda, Victoza), semaglutide (Rybelsus), Terzepatide (Zepbound)  If YES, okay to schedule AND route to RN nurse / Dr. Ball's Team      Any allergies to contrast dye, iodine, shellfish, or numbing and steroid medications? No  If YES, schedule and add allergy information to appointment notes AND route to the RN pool/ Dr. Ball's Team  If JOSE A and Contrast Dye / Iodine Allergy? DO NOT SCHEDULE, route to RN pool/ Dr. Ball's Team  Allergies: Penicillins, Sildenafil, and Tadalafil     Does patient have an active infection or treated for one within the past week? No  Is patient currently taking any antibiotics or steroid medications?  No   For patients on chronic, preventative, or prophylactic antibiotics, procedures may be scheduled.   For patients on antibiotics for active or recent infection, schedule 4 days after completed.  For patients on steroid medications, schedule 4 days after completed.     Has the patient had a flu shot or any other vaccinations within the past 7 days? No  If yes, explain that for the vaccine to work best they need to:     wait 1 week before and 1 week after getting any Vaccine  wait 1 week before and 2 weeks after getting any Covid Vaccine   If patient has concerns about the timing, send to RN pool/ Dr. Ball's Team    Does patient have an MRI/CT?  Not Applicable Include Date and Check Procedure Scheduling Grid to see if  required.  Was the MRI/CT done within the last 3 years?  NA   If no route to RN Pool/ Dr. Ball's Team  If yes, where was the MRI/CT done?    Refer to PACS Transmissions list for approved external locations and route to RN Pool High Priority/ Dr. Arriazas Team  If MRI was not done at approved external location do NOT schedule and route to RN pool/ Dr. Arriazas Team    If patient has an imaging disc, the injection MAY be scheduled but patient must bring disc to appt or appt will be cancelled.    Is patient able to transfer to a procedure table with minimal or no assistance? Yes   If no, do NOT schedule and route to RN Pool/ Dr. Ball's Team    Procedure Specific Instructions:  If celiac plexus block, informed patient NPO for 6 hours and that it is okay to take medications with sips of water, especially blood pressure medications Not Applicable       If this is for a cervical procedure, informed patient that aspirin needs to be held for 6 days.   Not Applicable    Sedation, If Sedation is ordered for any procedure, patient must be NPO for 6 hours prior to procedure Not Applicable    If IV needed:  Do not schedule procedures requiring IV placement in the first appointment of the day or first appointment after lunch. Do NOT schedule at 0745, 0815 or 1245.   Instructed patient to arrive 30 minutes early for IV start if required. (Check Procedure Scheduling Grid)  Not Applicable    Reminders:  If you are started on any steroids or antibiotics between now and your appointment, you must contact us because the procedure may need to be cancelled.  Yes    As a reminder, receiving steroids can decrease your body's ability to fight infection.   Would you still like to move forward with scheduling the injection?  Yes    IV Sedation is not provided for procedures. If oral anti-anxiety medication is needed, the patient should request this from their referring provider.    Instruct patient to arrive as directed prior to the  scheduled appointment time:  If IV needed 30 minutes before appointment time     For patients 85 or older we recommend having an adult stay w/ them for the remainder of the day.     If the patient is Diabetic, remind them to bring their glucometer.    Dr. Palmer Pt's - Imaging Orders Needed   Please send all injections to RN Pool NO   Red Flags? NO    Does the patient have any questions?  NO  Rossy Foote  [facility]

## 2025-06-19 NOTE — LETTER
6/19/2025      Gabriel Hunter  [address] [place] [address]      Dear Colleague,    Thank you for referring your patient, Gabriel Hunter, to the [facility] [place]. Please see a copy of my visit note below.    In-Person Visit    Spine Surg Hx:   ~1999 - AP fusion L1-2 and L3-4, with unilateral translaminar facet screw fixation; implantation of bone stim (Dr. Dariusz Hill, [facility]).  02/25/2013 - Part 1: LLIF (R approach) L2-3; Right ant ICBG harvest.  Part 2: Bilateral PPS L2-3 (Carlitos, [facility]).  [Implants: NuVasive XLIF system, CoRoent 22mm wide PEEK cage; NuVasive Precept perc screw system, 5.5 mm titanium rods x2].    Chief Complaint   Patient presents with     RECHECK     NEED TO GET [facility]. Low back pain//[facility]o con/prev pt of Dr. Urbina [facility] Fusion [facility]bar       Last Visit Date: 8/13/2024 (c/o Denisse Devlin)  Previous Impression:  79/M with  L1 fracture, minimally displaced, stable . Improved pain.  History of previous lumbar fusions, fused L1-4  Previous Plan:  He can follow-up on an as-needed basis.       S>  79 year old male, recheck low back pain    Accompanied by his wife today    Primarily here today as a referral/second opinion [facility].  Continuing to report ongoing chronic issues of balance and neuropathy.  Feels that these are unchanged from his last visit.  Pain from his L1 fracture has resolved.  Denies any back pain.  Denies any new numbness, tingling, or weakness.  Notes that he has always had a foot drop on the left  Per report, patient did undergo [facility] that provided him with 0% relief.  Unable to find record of this in the chart.  Patient was [facility] who did not feel that any sort of surgical intervention would be beneficial to the patient.      Oswestry (VASQUEZ)  Questionnaire        6/19/2025    12:47 PM   OSWESTRY DISABILITY INDEX   Count 9   Sum 20   Oswestry Score (%) 44.44 %      VASQUEZ 11/7/17                    35.56%  VASQUEZ 6/22/24                    78%  VASQUEZ 8/13/24                    33%      Visual Analog Pain Scale  Back Pain Scale 0-10: 2 (rt low back pain)  Right leg pain: 6 (foot pain)  Left leg pain: 6 (foot pain)  Neck Pain Scale 0-10: 2  Right arm pain: 0  Left arm pain: 0    PROMIS-10 Scores  Global Mental Health Score: 11  Global Physical Health Score: 11  PROMIS TOTAL - SUBSCORES: 22    Physical Examination:    Alert, oriented x 3, cooperative.  Not in CP distress.  There were no vitals taken for this visit.  Ambulates independently.   Grossly neurologically intact.  5 out of 5 strength to L2, L3, L5.  Right L4 5 out of 5 strength.  Left L4 0 out of 5 strength, foot drop.  Sensation is grossly intact to light touch in all nerve distributions    Imaging:    EOS imaging dated 06/19/2025 demonstrate satisfactory global alignment.  Unchanged from prior.  MRI of the lumbar spine completed in March and reviewed today demonstrating mild to moderate central stenosis at the T12-L1 level.  No significant amount of foraminal stenosis at this level.  No significant amount of central stenosis at the lower lumbar levels    Assessment:    79/M with  1. L1 fracture, minimally displaced, stable . Improved pain.  2. History of previous lumbar fusions, fused L1-4  3.  Chronic balance issues and neuropathy    Plan:    Discussed at length the patient's symptoms and imaging findings.  His symptoms appear to be chronic in nature, and these are unchanged per him and his wife.  Discussed that his MRI is reassuring today.  We do not see any findings that would warrant any sort of surgical intervention.    Discussed that we could consider an SI joint injection to see if this is driving any component of his pain and symptoms.  The patient would like to proceed with this.    -  Fluoro-guided R SI joint injection, therapeutic (Note: [facility] patient; thus, procedure may need to be done at [facility], or may require [facility] approval prior).    Virtual RTC to review injection response, and discuss further options.  If negative response, I'm afraid we may not have anything to offer to him from surgical standpoint.    Isidro Pepper MD  Orthopaedic Surgery PGY-5    Attestation:  I (Dr. Chepe Urbina - Spine Surgeon) have personally evaluated patient with PGY-5 Evgeny, and agree with findings and plan outlined in the note, which I also edited.  I discussed at length with the patient/family, explained the nature of spinal condition, and formulated workup and/or treatment plan together.  All questions were answered to the best of my ability and to patient's apparent satisfaction.     >25 minutes spent on the date of the encounter doing chart review/review of outside records/review of test results/interpretation of tests/patient visit/documentation/discussion with other provider(s)/discussion with patient and family.    Chepe Urbina MD    Orthopaedic Spine Surgery  Dept Orthopaedic Surgery, [facility]  [phone] office, [phone] pager  [email or web address]     Again, thank you for allowing me to participate in the care of your patient.        Sincerely,        Chepe Urbina MD    Electronically signed

## 2025-06-27 NOTE — PROGRESS NOTES
[facility] - Procedure Note    Date of Service: 6/30/2025  Procedure performed: RIGHT sacroiliac joint injection  Diagnosis: Sacroiliac joint pain  : Irene Torrez MD   Anesthesia: none    Indications: Gabriel Hunter is a 80 year old male who is seen at the request of Dr. Urbina for a sacroiliac joint injection. The patient describes low back and buttock pain. Exam shows full strength and sensation in both lower extremities, tenderness of the sacroiliac (SI) joint, and positive FABERE on the right. The patient reports minimal improvement with conservative treatment, including previous surgeries, PT and medications.    Spine Surg Hx:   ~1999 - AP fusion L1-2 and L3-4, with unilateral translaminar facet screw fixation; implantation of bone stim (Dr. Dariusz Hill, [facility]).  02/25/2013 - Part 1: LLIF (R approach) L2-3; Right ant ICBG harvest.  Part 2: Bilateral PPS L2-3 (Carlitos, [facility]).  [Implants: NuVasive XLIF system, CoRoent 22mm wide PEEK cage; NuVasive Precept perc screw system, 5.5 mm titanium rods x2].    LUMBAR MRI was done at [facility] on 3/14/2025 and showed:        Allergies:      Allergies   Allergen Reactions    Penicillins Itching and Anaphylaxis    Sildenafil Muscle Pain (Myalgia)    Tadalafil Muscle Pain (Myalgia)        Vitals:  /67   Pulse 76   SpO2 98%     Options/alternatives, benefits and risks were discussed with the patient including bleeding, infection, tissue trauma, exposure to radiation, reaction to medications including seizure, nerve injury, weakness, and numbness.  Questions were answered to his satisfaction and he agrees to proceed. Voluntary informed consent was obtained and signed.     Procedure:  After getting informed consent, patient was brought into the procedure suite and was placed in a prone position on the procedure table.   A Pause for the Cause was performed.  Patient was prepped and draped in sterile fashion.     After  identifying the right SI joint, the C-arm was rotated to a obliquely to obtain the best view of the inferior angle of the joint.  A total of 2 ml of Lidocaine 1%  was used to anesthetize the skin at a skin entry site coaxial with the fluoroscopy beam at this location.  A 22 gauge 3.5 inch needle was advanced under intermittent fluoroscopy until it was felt to enter the SI joint.    A total of 1 ml of Omnipaque-300 was injected, confirming appropriate position, with spread into the intraarticular space, with no intravascular uptake note and 0 ml's was wasted. Location was verified in lateral view.    3 ml of 0.5% bupivacaine with 40 mg of kenalog was injected.  The needle was removed. Hemostasis was achieved, the area was cleaned, and bandaids were placed when appropriate.  The patient tolerated the procedure well, and was taken to the recovery room.    Images were saved to PACS.    Pre-procedure pain score: 2/10  Post-procedure pain score: 0/10    Following today's procedure, the patient was advised to contact the Pain Management Center for any of the following:   Fever, chills, or night sweats   New onset of pain, numbness, or weakness   Any questions/concerns regarding the procedure    -f/u with the referring provider      ALDO RAINEY MD   Pain Management

## 2025-06-30 ENCOUNTER — RADIOLOGY INJECTION OFFICE VISIT (OUTPATIENT)
Dept: PALLIATIVE MEDICINE | Facility: CLINIC | Age: 80
End: 2025-06-30
Attending: ORTHOPAEDIC SURGERY
Payer: COMMERCIAL

## 2025-06-30 VITALS — OXYGEN SATURATION: 94 % | DIASTOLIC BLOOD PRESSURE: 70 MMHG | HEART RATE: 74 BPM | SYSTOLIC BLOOD PRESSURE: 133 MMHG

## 2025-06-30 DIAGNOSIS — M46.1 SACROILIITIS: Primary | ICD-10-CM

## 2025-06-30 DIAGNOSIS — M53.3 SI (SACROILIAC) JOINT DYSFUNCTION: ICD-10-CM

## 2025-06-30 PROCEDURE — 27096 INJECT SACROILIAC JOINT: CPT | Mod: RT | Performed by: ANESTHESIOLOGY

## 2025-06-30 RX ORDER — TRIAMCINOLONE ACETONIDE 40 MG/ML
40 INJECTION, SUSPENSION INTRA-ARTICULAR; INTRAMUSCULAR ONCE
Status: COMPLETED | OUTPATIENT
Start: 2025-06-30 | End: 2025-06-30

## 2025-06-30 RX ADMIN — TRIAMCINOLONE ACETONIDE 40 MG: 40 INJECTION, SUSPENSION INTRA-ARTICULAR; INTRAMUSCULAR at 16:01

## 2025-06-30 ASSESSMENT — PAIN SCALES - GENERAL
PAINLEVEL_OUTOF10: NO PAIN (0)
PAINLEVEL_OUTOF10: MILD PAIN (2)

## 2025-06-30 NOTE — PATIENT INSTRUCTIONS
[facility] Procedure Discharge Instructions    Today you saw:   Dr. Irene Torrez     Your procedure: RIGHT Sacro-iliac joint injection        Medications used:  Lidocaine (anesthetic)  Bupivacaine (anesthetic)   Kenalog (steroid)  Omnipaque (contrast)          If you were holding your blood thinning medication, please restart taking it: N/A        Be cautious when walking as numbness and/or weakness in the legs may occur up to 6-8 hours after the procedure due to effect of the local anesthetic  Do not drive for 6 hours. The effect of the local anesthetic could slow your reflexes.   Avoid strenuous activity for the first 24 hours. You may resume your regular activities after that.   You may shower, however avoid swimming, tub baths or hot tubs for 24 hours following your procedure  You may have a mild to moderate increase in pain for several days following the injection.    You may use ice packs for 10-15 minutes, 3 to 4 times a day at the injection site for comfort  Do not use heat to painful areas for 6 to 8 hours. This will give the local anesthetic time to wear off and prevent you from accidentally burning your skin.  Unless you have been directed to avoid the use of anti-inflammatory medications (NSAIDS-ibuprofen, Aleve, Motrin), you may use these medications or Tylenol for pain control if needed.   With diabetes, check your blood sugar more frequently than usual as your blood sugar may be higher than normal for 10-14 days following a steroid injection. Contact your doctor who manages your diabetes if your blood sugar is higher than usual  Possible side effects of steroids that you may experience include flushing, elevated blood pressure, increased appetite, mild headaches and restlessness.  All of these symptoms will get better with time.  It may take up to 14 days for the steroid medication to start working although you may feel the effect as early as a few days after the procedure.   Follow  up with Dr. Urbina-Ortho Spine in 2-3 weeks    If you experience any of the following, call the pain center line during work hours at [phone] or on-call physician after hours at [phone]:  -Fever over 100 degree F  -Swelling, bleeding, redness, drainage, warmth at the injection site  -Progressive weakness or numbness in your legs or arms  -Loss of bowel or bladder function  -Unusual headache that is not relieved by Tylenol or your regular headache medication  -Unusual new onset of pain that is not improving

## 2025-06-30 NOTE — NURSING NOTE
Discharge Information    IV Discontiued Time:  NA    Amount of Fluid Infused:  NA    Discharge Criteria = When patient returns to baseline or as per MD order    Consciousness:  Pt is fully awake    Circulation:  BP +/- 20% of pre-procedure level    Respiration:  Patient is able to breathe deeply    O2 Sat:  Patient is able to maintain O2 Sat >92% on room air    Activity:  Moves 4 extremities on command    Ambulation:  Patient is able to stand and walk or stand and pivot into wheelchair    Dressing:  Clean/dry or No Dressing    Notes:   Discharge instructions and AVS given to patient    Patient meets criteria for discharge?  YES    Admitted to PCU?  No    Responsible adult present to accompany patient home?  Yes    Signature/Title:    Jocy Roth RN  RN Care Coordinator  [facility]

## 2025-06-30 NOTE — NURSING NOTE
Pre-procedure Intake  If YES to any questions or NO to having a   Please complete laminated checklist and leave on the computer keyboard for Provider, verbally inform provider if able.    For SCS Trial, RFA's or any sedation procedure:  Have you been fasting? NA  If yes, for how long?     Are you taking any any blood thinners such as Coumadin, Warfarin, Jantoven, Pradaxa Xarelto, Eliquis, Edoxaban, Enoxaparin, Lovenox, Heparin, Arixtra, Fondaparinux, or Fragmin? OR Antiplatelet medication such as Plavix, Brilinta, or Effient?   No   If yes, when did you take your last dose?     Do you take aspirin?  No  If cervical procedure, have you held aspirin for 6 days?   NA    Is the Pt taking any GLP-1 Antagonist (hold needed for sedation patients only)  (semaglutide (Ozempic, Wegovy), dulaglutide (Trulicity), exenatide ER (Bydureon), tirzepatide (Mounjaro), Liraglutide (Saxenda, Victoza), semaglutide (Rybelsus)     NA  If yes, when did you take your last dose?     Do you have any allergies to contrast dye, iodine, steroid and/or numbing medications?  NO    Are you currently taking antibiotics or have an active infection?  NO    Have you had a fever/elevated temperature within the past week? NO    Are you currently taking oral steroids? NO    Do you have a ? Yes    Are you pregnant or breastfeeding?  Not Applicable    Have you received any vaccinations in the last week? NO    Notify provider and RNs if systolic BP >170, diastolic BP >100, P >100 or O2 sats < 90%      Selene Magaña MA  [facility]

## 2025-07-08 ENCOUNTER — VIRTUAL VISIT (OUTPATIENT)
Dept: ORTHOPEDICS | Facility: CLINIC | Age: 80
End: 2025-07-08
Payer: MEDICARE

## 2025-07-08 VITALS — BODY MASS INDEX: 37.28 KG/M2 | WEIGHT: 246 LBS | HEIGHT: 68 IN

## 2025-07-08 DIAGNOSIS — M53.3 CHRONIC LEFT SACROILIAC JOINT PAIN: ICD-10-CM

## 2025-07-08 DIAGNOSIS — G89.29 CHRONIC RIGHT SACROILIAC JOINT PAIN: Primary | ICD-10-CM

## 2025-07-08 DIAGNOSIS — M53.3 CHRONIC RIGHT SACROILIAC JOINT PAIN: Primary | ICD-10-CM

## 2025-07-08 DIAGNOSIS — G89.29 CHRONIC LEFT SACROILIAC JOINT PAIN: ICD-10-CM

## 2025-07-08 PROCEDURE — 1126F AMNT PAIN NOTED NONE PRSNT: CPT | Performed by: ORTHOPAEDIC SURGERY

## 2025-07-08 PROCEDURE — 98005 SYNCH AUDIO-VIDEO EST LOW 20: CPT | Performed by: ORTHOPAEDIC SURGERY

## 2025-07-08 ASSESSMENT — PAIN SCALES - GENERAL: PAINLEVEL_OUTOF10: NO PAIN (0)

## 2025-07-08 NOTE — NURSING NOTE
Current patient location: [address] [place] [address] [place] [address]    Is the patient currently in the state of [place]? YES    Visit mode: VIDEO    If the visit is dropped, the patient can be reconnected by:VIDEO VISIT: Text to cell phone:   Telephone Information:   Mobile [phone]       Will anyone else be joining the visit? NO  (If patient encounters technical issues they should call [phone]    Are changes needed to the allergy or medication list? No    Are refills needed on medications prescribed by this physician? NO    Rooming Documentation:  Questionnaire(s) completed    Reason for visit: RECHECK    Krystal Rosa VVF  No vitals

## 2025-07-08 NOTE — LETTER
"7/8/2025      Gabriel Hunter  [address] [place] [address]      Dear Colleague,    Thank you for referring your patient, Gabriel Hunter, to the [facility]. Please see a copy of my visit note below.    VIRTUAL VISIT:  Patient is evaluated today via billable virtual visit.    The patient has been notified of following:   \"This virtual visit will be conducted via a call between you and your physician/provider. We have found that certain health care needs can be provided without the need for an in-person physical exam.  This service lets us provide the care you need with a virtual conversation.  If a prescription is necessary we can send it directly to your pharmacy.  If lab work is needed we can place an order for that and you can then stop by our lab to have the test done at a later time.  If during the course of the call the physician/provider feels a virtual visit is not appropriate, you will not be charged for this service.\"     Spine Surg Hx:   ~1999 - AP fusion L1-2 and L3-4, with unilateral translaminar facet screw fixation; implantation of bone stim (Dr. Dariusz Hill, [facility]).  02/25/2013 - Part 1: LLIF (R approach) L2-3; Right ant ICBG harvest.  Part 2: Bilateral PPS L2-3 (Carlitos [facility]).  [Implants: NuVasive XLIF system, CoRoent 22mm wide PEEK cage; NuVasive Precept perc screw system, 5.5 mm titanium rods x2].      Physician has received verbal consent for a Virtual Visit from the patient.  Platform used:  IActionable Video  Time:  3:59pm to 4:05pm  Originating Location (pt. Location): Home  Distant Location (provider location):  [facility]     Chief Complaint   Patient presents with     RECHECK       Last Visit Date: 6/19/25  Previous Impression:  79/M with:  1.  L1 fracture, minimally displaced, stable . Improved pain.  2.  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       S>  80 year old male, here to review injection response, and discuss further options.    6/30/25 - Right SIJ injection with steroid (Dr. Irene Torrez, [facility]).  Per report, pain completely relieved from 2/10 to 0.  Per patient, it hit the right spot; was able to walk quite a bit more over the weekend.  It's the first time he was able to walk around a city block in a long time.  He is now hoping he could get a similar injection on the other side.    Says the above injection still currently helping (8 days later).      Oswestry (VASQUEZ) Questionnaire        6/19/2025    12:47 PM   OSWESTRY DISABILITY INDEX   Count 9   Sum 20   Oswestry Score (%) 44.44 %        VASQUEZ 11/7/17 35.56%  VASQUEZ 6/22/24 78%  VASQUEZ 8/13/24 33%  VASQUEZ 6/19/25 44.44%      Physical Examination:    This was a virtual visit, so very limited exam could be performed.  Patient seemed alert, oriented x 3, cooperative, with coherent speech, and not in distress.  Able to respond to questions appropriately and follow instructions.    Imaging:    No new x-rays obtained for this visit.    Assessment:    80/m with:  1.  Bilateral R>L SIJ pain, with positive response to Right SI joint txtic injection (6/30/25 Dr. Irene Torrez, [facility]).  2.  History of L1 fracture (DOI: 6/7/24), minimally displaced, stable . Improved pain.  3.  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  4.  Chronic balance issues and neuropathy.    Plan:    - Left SI joint therapeutic injection.  Will need [facility] approval; may again be done at [facility].  May get repeat txtic injections to R and L SI joints 2-3x/year, as long as they are helping.  Discussed that if time comes that they are not as effective or wearing off more quickly, may eventually consider surgery in form of MIS SIJ fusion.    RTC prn.  I personally spent >20 minutes on the  date of the encounter doing chart review/review of outside records/review of test results/interpretation of tests/patient visit/documentation/discussion with other provider(s)/discussion with patient and family.      Chepe Urbina MD    Orthopaedic Spine Surgery  Dept Orthopaedic Surgery, [facility]  [phone] office, [phone] pager  [email or web address]      Again, thank you for allowing me to participate in the care of your patient.        Sincerely,        Chepe Urbina MD    Electronically signed

## 2025-07-08 NOTE — PROGRESS NOTES
"VIRTUAL VISIT:  Patient is evaluated today via billable virtual visit.    The patient has been notified of following:   \"This virtual visit will be conducted via a call between you and your physician/provider. We have found that certain health care needs can be provided without the need for an in-person physical exam.  This service lets us provide the care you need with a virtual conversation.  If a prescription is necessary we can send it directly to your pharmacy.  If lab work is needed we can place an order for that and you can then stop by our lab to have the test done at a later time.  If during the course of the call the physician/provider feels a virtual visit is not appropriate, you will not be charged for this service.\"     Spine Surg Hx:   ~1999 - AP fusion L1-2 and L3-4, with unilateral translaminar facet screw fixation; implantation of bone stim (Dr. Dariusz Hill, [facility]).  02/25/2013 - Part 1: LLIF (R approach) L2-3; Right ant ICBG harvest.  Part 2: Bilateral PPS L2-3 (Carlitos [facility]).  [Implants: NuVasive XLIF system, CoRoent 22mm wide PEEK cage; NuVasive Precept perc screw system, 5.5 mm titanium rods x2].      Physician has received verbal consent for a Virtual Visit from the patient.  Platform used:  BIOeCON Video  Time:  3:59pm to 4:05pm  Originating Location (pt. Location): Home  Distant Location (provider location):  [facility] [place]     Chief Complaint   Patient presents with    RECHECK       Last Visit Date: 6/19/25  Previous Impression:  79/M with:  1.  L1 fracture, minimally displaced, stable . Improved pain.  2.  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       S>  80 year old male, here to review injection response, and discuss further options.    6/30/25 - Right SIJ injection with steroid " (Dr. Irene Torrez, [facility]).  Per report, pain completely relieved from 2/10 to 0.  Per patient, it hit the right spot; was able to walk quite a bit more over the weekend.  It's the first time he was able to walk around a city block in a long time.  He is now hoping he could get a similar injection on the other side.    Says the above injection still currently helping (8 days later).      Oswestry (VASQUEZ) Questionnaire        6/19/2025    12:47 PM   OSWESTRY DISABILITY INDEX   Count 9   Sum 20   Oswestry Score (%) 44.44 %        VASQUEZ 11/7/17 35.56%  VASQUEZ 6/22/24 78%  VASQUEZ 8/13/24 33%  VASQUEZ 6/19/25 44.44%      Physical Examination:    This was a virtual visit, so very limited exam could be performed.  Patient seemed alert, oriented x 3, cooperative, with coherent speech, and not in distress.  Able to respond to questions appropriately and follow instructions.    Imaging:    No new x-rays obtained for this visit.    Assessment:    80/m with:  1.  Bilateral R>L SIJ pain, with positive response to Right SI joint txtic injection (6/30/25 Dr. Irene Torrez, [facility]).  2.  History of L1 fracture (DOI: 6/7/24), minimally displaced, stable . Improved pain.  3.  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  4.  Chronic balance issues and neuropathy.    Plan:    - Left SI joint therapeutic injection.  Will need VA approval; may again be done at [facility].  May get repeat txtic injections to R and L SI joints 2-3x/year, as long as they are helping.  Discussed that if time comes that they are not as effective or wearing off more quickly, may eventually consider surgery in form of MIS SIJ fusion.    RTC prn.  I personally spent >20 minutes on the date of the encounter doing chart review/review of outside records/review of test results/interpretation of tests/patient visit/documentation/discussion with other provider(s)/discussion with patient and family.      Chepe Urbina MD  Associate  Professor  Orthopaedic Spine Surgery  Dept Orthopaedic Surgery, [facility]  [phone] office, [phone] pager  [email or web address]

## 2025-07-10 ENCOUNTER — TELEPHONE (OUTPATIENT)
Dept: ORTHOPEDICS | Facility: CLINIC | Age: 80
End: 2025-07-10
Payer: MEDICARE

## 2025-07-10 DIAGNOSIS — M53.3 CHRONIC RIGHT SACROILIAC JOINT PAIN: Primary | ICD-10-CM

## 2025-07-10 DIAGNOSIS — G89.29 CHRONIC LEFT SACROILIAC JOINT PAIN: ICD-10-CM

## 2025-07-10 DIAGNOSIS — M53.3 CHRONIC LEFT SACROILIAC JOINT PAIN: ICD-10-CM

## 2025-07-10 DIAGNOSIS — G89.29 CHRONIC RIGHT SACROILIAC JOINT PAIN: Primary | ICD-10-CM

## 2025-07-10 NOTE — TELEPHONE ENCOUNTER
Patient tried to schedule his back injection ordered by Dr Urbina, and pain management is stating that they need an order to pain management for the injection to be scheduled.    Please call patient to confirm once this is done at [phone], any time, okay to leave detailed msg.

## 2025-07-10 NOTE — TELEPHONE ENCOUNTER
See phone message from pt. & dictation from 7-8-25.    I called & told pt that injection order was originally placed for radiology but since pt wants to do repeat injections with pain clinic as before,  we placed new pain clinic injection order & pt can call [phone] to schedule.   Call back prn.  Pt agreed.  Shanae Carlin RN.

## 2025-07-16 ENCOUNTER — TELEPHONE (OUTPATIENT)
Dept: PALLIATIVE MEDICINE | Facility: CLINIC | Age: 80
End: 2025-07-16
Payer: MEDICARE

## 2025-07-16 NOTE — TELEPHONE ENCOUNTER
"Screening Questions for Radiology Injections:    Injection to be done at which interventional clinic site? [facility]    If choosing [facility] for location, please inform patient:  \"[facility] is a Hospital based clinic. Before your visit, you should check with your insurance about how it covers the charges for facility services in a hospital-based clinic.     Procedure ordered by Dr. Urbina    Procedure ordered? left SI joint therapeutic injection   Transforaminal Cervical JOSE A - [place] [facility] - No [facility] providers perform this procedure    What insurance would patient like us to bill for this procedure? [facility]  IF SCHEDULING IN [place] PAIN OR SPINE PLEASE SCHEDULE AT LEAST 7-10 BUSINESS DAYS OUT SO A PA CAN BE OBTAINED  Worker's comp or MVA (motor vehicle accident) -Any injection DO NOT SCHEDULE and route to Ira Foote.    [email or web address] insurance - For ALL INJECTIONS DO NOT SCHEDULE and route to Maia Gan.     ALL BCBS, [employer] and [employer] CIGNA - DO NOT SCHEDULE and route to Maia Gan  MEDICA- ALL INJECTIONS- route to Maia Gan    Is patient scheduled at [facility]? NO    If YES, route every encounter to [facility] SPINE CENTER CARE NAVIGATION POOL [[phone]]    Is an  needed? No     Patient has a  home? (Review Grid) Not Applicable    Any chance of pregnancy? Not Applicable   If YES, do NOT schedule and route to RN pool  - Dr. Ball route to PM&R Nurse  [[id number]]      Is patient actively being treated for cancer or immunocompromised? No  If YES, do NOT schedule and route to RN pool/ Dr. Ball's Team    Does the patient have a bleeding or clotting disorder? No   If YES, okay to schedule AND route to RN  / Dr. Ball's Team   (For any patients with platelet count <100, RN must forward to provider)    Is patient taking any Blood Thinners OR Antiplatelet medication?  No   If hold needed, do NOT schedule, route to RN pool/ Dr." Lizzy's Team  Examples:   Blood Thinners: (Coumadin, Warfarin, Jantoven, Pradaxa, Xarelto, Eliquis, Edoxaban, Enoxaparin, Lovenox, Heparin, Arixtra, Fondaparinux or Fragmin)  Antiplatelet Medications: (Plavix, Brilinta or Effient)     Is patient taking any aspirin products (includes: Aspirin 81 mg, Excedrin, and Fiorinal)? No.    If yes route to RN pool/ Dr. Ball's Team - Do not schedule    Is patient taking any GLP-1 Antagonist (hold needed for sedation patients only) Yes - Wegovy   (semaglutide (Ozempic, Wegovy), dulaglutide (Trulicity), exenatide ER (Bydureon), tirzepatide (Mounjaro), Liraglutide (Saxenda, Victoza), semaglutide (Rybelsus), Terzepatide (Zepbound)  If YES, okay to schedule AND route to RN nurse / Dr. Ball's Team      Any allergies to contrast dye, iodine, shellfish, or numbing and steroid medications? No  If YES, schedule and add allergy information to appointment notes AND route to the RN pool/ Dr. Ball's Team  If JOSE A and Contrast Dye / Iodine Allergy? DO NOT SCHEDULE, route to RN pool/ Dr. Ball's Team  Allergies: Penicillins, Sildenafil, and Tadalafil     Does patient have an active infection or treated for one within the past week? No  Is patient currently taking any antibiotics or steroid medications?  No   For patients on chronic, preventative, or prophylactic antibiotics, procedures may be scheduled.   For patients on antibiotics for active or recent infection, schedule 4 days after completed.  For patients on steroid medications, schedule 4 days after completed.     Has the patient had a flu shot or any other vaccinations within the past 7 days? No  If yes, explain that for the vaccine to work best they need to:     wait 1 week before and 1 week after getting any Vaccine  wait 1 week before and 2 weeks after getting any Covid Vaccine   If patient has concerns about the timing, send to RN pool/ Dr. Ball's Team    Does patient have an MRI/CT?  Not Applicable Include Date and Check  Procedure Scheduling Grid to see if required.  Was the MRI/CT done within the last 3 years?  NA   If no route to RN Pool/ Dr. Arriazas Team  If yes, where was the MRI/CT done?    Refer to PACS Transmissions list for approved external locations and route to RN Pool High Priority/ Dr. Arriazas Team  If MRI was not done at approved external location do NOT schedule and route to RN pool/ Dr. Arriazas Team    If patient has an imaging disc, the injection MAY be scheduled but patient must bring disc to appt or appt will be cancelled.    Is patient able to transfer to a procedure table with minimal or no assistance? Yes   If no, do NOT schedule and route to  Pool/ Dr. Ball's Team    Procedure Specific Instructions:  If celiac plexus block, informed patient NPO for 6 hours and that it is okay to take medications with sips of water, especially blood pressure medications Not Applicable       If this is for a cervical procedure, informed patient that aspirin needs to be held for 6 days.   Not Applicable    Sedation, If Sedation is ordered for any procedure, patient must be NPO for 6 hours prior to procedure Not Applicable    If IV needed:  Do not schedule procedures requiring IV placement in the first appointment of the day or first appointment after lunch. Do NOT schedule at 0745, 0815 or 1245.     Instructed patient to arrive 30 minutes early for IV start if required. (Check Procedure Scheduling Grid)  Not Applicable    Reminders:  If you are started on any steroids or antibiotics between now and your appointment, you must contact us because the procedure may need to be cancelled.  Yes    As a reminder, receiving steroids can decrease your body's ability to fight infection.   Would you still like to move forward with scheduling the injection?  Yes    IV Sedation is not provided for procedures. If oral anti-anxiety medication is needed, the patient should request this from their referring provider.    Instruct patient to  arrive as directed prior to the scheduled appointment time:  If IV needed 30 minutes before appointment time     For patients 85 or older we recommend having an adult stay w/ them for the remainder of the day.     If the patient is Diabetic, remind them to bring their glucometer.    Dr. Palmer Pt's - Imaging Orders Needed   Please send all injections to RN Pool Not Applicable   Red Flags? Not Applicable    Does the patient have any questions?  NO  Krystal Soriano  [facility]

## 2025-07-17 NOTE — PROGRESS NOTES
[facility] - Procedure Note    Date of Service: 7/21/2025  Procedure performed: LEFT sacroiliac joint injection  Diagnosis: Sacroiliac joint pain  : Irene Torrez MD   Anesthesia: none    Indications: Gabriel Hunter is a 80 year old male who is seen at the request of Dr. Urbina for a sacroiliac joint injection. The patient describes low back and buttock pain. Exam shows full strength and sensation in both lower extremities, tenderness of the sacroiliac (SI) joint, and positive FABERE on the LEFT. The patient reports minimal improvement with conservative treatment, including previous surgeries, PT and medications.    Previous RIGHT SI joint injection done 6/30/2025    Spine Surg Hx:   ~1999 - AP fusion L1-2 and L3-4, with unilateral translaminar facet screw fixation; implantation of bone stim (Dr. Dariusz Hill, [facility]).  02/25/2013 - Part 1: LLIF (R approach) L2-3; Right ant ICBG harvest.  Part 2: Bilateral PPS L2-3 (Carlitos, [facility]).  [Implants: NuVasive XLIF system, CoRoent 22mm wide PEEK cage; NuVasive Precept perc screw system, 5.5 mm titanium rods x2].    LUMBAR MRI was done at [facility] on 3/14/2025 and showed:        Allergies:      Allergies   Allergen Reactions    Penicillins Itching and Anaphylaxis    Sildenafil Muscle Pain (Myalgia)    Tadalafil Muscle Pain (Myalgia)        Vitals:  /74   Pulse 70   SpO2 94%     Options/alternatives, benefits and risks were discussed with the patient including bleeding, infection, tissue trauma, exposure to radiation, reaction to medications including seizure, nerve injury, weakness, and numbness.  Questions were answered to his satisfaction and he agrees to proceed. Voluntary informed consent was obtained and signed.     Procedure:  After getting informed consent, patient was brought into the procedure suite and was placed in a prone position on the procedure table.   A Pause for the Cause was performed.  Patient was  prepped and draped in sterile fashion.     After identifying the LEFT SI joint, the C-arm was rotated to a obliquely to obtain the best view of the inferior angle of the joint.  A total of 2 ml of Lidocaine 1%  was used to anesthetize the skin at a skin entry site coaxial with the fluoroscopy beam at this location.  A 22 gauge 3.5 inch needle was advanced under intermittent fluoroscopy until it was felt to enter the SI joint.    A total of 1 ml of Omnipaque-300 was injected, confirming appropriate position, with spread into the intraarticular space, with no intravascular uptake note and 0 ml's was wasted. Location was verified in lateral view.    3 ml of 0.5% bupivacaine with 40 mg of kenalog was injected.  The needle was removed. Hemostasis was achieved, the area was cleaned, and bandaids were placed when appropriate.  The patient tolerated the procedure well, and was taken to the recovery room.    Images were saved to PACS.    Pre-procedure pain score: 2/10  Post-procedure pain score: 0/10    Following today's procedure, the patient was advised to contact the Pain Management Center for any of the following:   Fever, chills, or night sweats   New onset of pain, numbness, or weakness   Any questions/concerns regarding the procedure    -f/u with the referring provider      ALDO RAINEY MD   Pain Management

## 2025-07-21 ENCOUNTER — RADIOLOGY INJECTION OFFICE VISIT (OUTPATIENT)
Dept: PALLIATIVE MEDICINE | Facility: CLINIC | Age: 80
End: 2025-07-21
Attending: PHYSICIAN ASSISTANT
Payer: COMMERCIAL

## 2025-07-21 VITALS — OXYGEN SATURATION: 94 % | DIASTOLIC BLOOD PRESSURE: 74 MMHG | SYSTOLIC BLOOD PRESSURE: 135 MMHG | HEART RATE: 70 BPM

## 2025-07-21 DIAGNOSIS — M46.1 SACROILIITIS: Primary | ICD-10-CM

## 2025-07-21 DIAGNOSIS — M53.3 CHRONIC RIGHT SACROILIAC JOINT PAIN: ICD-10-CM

## 2025-07-21 DIAGNOSIS — M53.3 SI (SACROILIAC) JOINT DYSFUNCTION: ICD-10-CM

## 2025-07-21 DIAGNOSIS — G89.29 CHRONIC LEFT SACROILIAC JOINT PAIN: ICD-10-CM

## 2025-07-21 DIAGNOSIS — M53.3 CHRONIC LEFT SACROILIAC JOINT PAIN: ICD-10-CM

## 2025-07-21 DIAGNOSIS — G89.29 CHRONIC RIGHT SACROILIAC JOINT PAIN: ICD-10-CM

## 2025-07-21 PROCEDURE — 27096 INJECT SACROILIAC JOINT: CPT | Mod: LT | Performed by: ANESTHESIOLOGY

## 2025-07-21 RX ORDER — TRIAMCINOLONE ACETONIDE 40 MG/ML
40 INJECTION, SUSPENSION INTRA-ARTICULAR; INTRAMUSCULAR ONCE
Status: COMPLETED | OUTPATIENT
Start: 2025-07-21 | End: 2025-07-21

## 2025-07-21 RX ADMIN — TRIAMCINOLONE ACETONIDE 40 MG: 40 INJECTION, SUSPENSION INTRA-ARTICULAR; INTRAMUSCULAR at 13:39

## 2025-07-21 ASSESSMENT — PAIN SCALES - GENERAL
PAINLEVEL_OUTOF10: MILD PAIN (2)
PAINLEVEL_OUTOF10: NO PAIN (0)

## 2025-07-21 NOTE — PATIENT INSTRUCTIONS
[facility] Procedure Discharge Instructions    Today you saw:   Dr. Irene Torrez     Your procedure:  LEFT Sacro-iliac joint injection        Medications used:  Lidocaine (anesthetic)  Bupivacaine (anesthetic)   Kenalog (steroid)  Omnipaque (contrast)          If you were holding your blood thinning medication, please restart taking it: N/A        Be cautious when walking as numbness and/or weakness in the legs may occur up to 6-8 hours after the procedure due to effect of the local anesthetic  Do not drive for 6 hours. The effect of the local anesthetic could slow your reflexes.   Avoid strenuous activity for the first 24 hours. You may resume your regular activities after that.   You may shower, however avoid swimming, tub baths or hot tubs for 24 hours following your procedure  You may have a mild to moderate increase in pain for several days following the injection.    You may use ice packs for 10-15 minutes, 3 to 4 times a day at the injection site for comfort  Do not use heat to painful areas for 6 to 8 hours. This will give the local anesthetic time to wear off and prevent you from accidentally burning your skin.  Unless you have been directed to avoid the use of anti-inflammatory medications (NSAIDS-ibuprofen, Aleve, Motrin), you may use these medications or Tylenol for pain control if needed.   With diabetes, check your blood sugar more frequently than usual as your blood sugar may be higher than normal for 10-14 days following a steroid injection. Contact your doctor who manages your diabetes if your blood sugar is higher than usual  Possible side effects of steroids that you may experience include flushing, elevated blood pressure, increased appetite, mild headaches and restlessness.  All of these symptoms will get better with time.  It may take up to 14 days for the steroid medication to start working although you may feel the effect as early as a few days after the procedure.   Follow  up with Denisse Devlin in 2-3 weeks    If you experience any of the following, call the pain center line during work hours at [phone] or on-call physician after hours at [phone]:  -Fever over 100 degree F  -Swelling, bleeding, redness, drainage, warmth at the injection site  -Progressive weakness or numbness in your legs or arms  -Loss of bowel or bladder function  -Unusual headache that is not relieved by Tylenol or your regular headache medication  -Unusual new onset of pain that is not improving

## 2025-07-21 NOTE — NURSING NOTE
Discharge Information    IV Discontiued Time:  NA    Amount of Fluid Infused:  NA    Discharge Criteria = When patient returns to baseline or as per MD order    Consciousness:  Pt is fully awake    Circulation:  BP +/- 20% of pre-procedure level    Respiration:  Patient is able to breathe deeply    O2 Sat:  Patient is able to maintain O2 Sat >92% on room air    Activity:  Moves 4 extremities on command    Ambulation:  Patient is able to stand and walk or stand and pivot into wheelchair    Dressing:  Clean/dry or No Dressing    Notes:   Discharge instructions and AVS given to patient    Patient meets criteria for discharge?  YES    Admitted to PCU?  No    Responsible adult present to accompany patient home?  Yes    Signature/Title:    Jocy Roth RN  RN Care Coordinator  [facility]

## 2025-07-21 NOTE — NURSING NOTE
Pre-procedure Intake  If YES to any questions or NO to having a   Please complete laminated checklist and leave on the computer keyboard for Provider, verbally inform provider if able.    For SCS Trial, RFA's or any sedation procedure:  Have you been fasting? NA  If yes, for how long?     Are you taking any any blood thinners such as Coumadin, Warfarin, Jantoven, Pradaxa Xarelto, Eliquis, Edoxaban, Enoxaparin, Lovenox, Heparin, Arixtra, Fondaparinux, or Fragmin? OR Antiplatelet medication such as Plavix, Brilinta, or Effient?   No   If yes, when did you take your last dose?     Do you take aspirin?  No  If cervical procedure, have you held aspirin for 6 days?   NA    Is the Pt taking any GLP-1 Antagonist (hold needed for sedation patients only)  (semaglutide (Ozempic, Wegovy), dulaglutide (Trulicity), exenatide ER (Bydureon), tirzepatide (Mounjaro), Liraglutide (Saxenda, Victoza), semaglutide (Rybelsus)     NA  If yes, when did you take your last dose?     Do you have any allergies to contrast dye, iodine, steroid and/or numbing medications?  NO    Are you currently taking antibiotics or have an active infection?  NO    Have you had a fever/elevated temperature within the past week? NO    Are you currently taking oral steroids? NO    Do you have a ? Yes    Are you pregnant or breastfeeding?  Not Applicable    Have you received any vaccinations in the last week? NO    Notify provider and RNs if systolic BP >170, diastolic BP >100, P >100 or O2 sats < 90%      Selene Magaña MA  [facility]

## 2025-07-28 ENCOUNTER — MYC MEDICAL ADVICE (OUTPATIENT)
Dept: ORTHOPEDICS | Facility: CLINIC | Age: 80
End: 2025-07-28
Payer: MEDICARE

## 2025-08-16 ENCOUNTER — HEALTH MAINTENANCE LETTER (OUTPATIENT)
Age: 80
End: 2025-08-16